# Patient Record
Sex: FEMALE | Race: WHITE | Employment: UNEMPLOYED | URBAN - METROPOLITAN AREA
[De-identification: names, ages, dates, MRNs, and addresses within clinical notes are randomized per-mention and may not be internally consistent; named-entity substitution may affect disease eponyms.]

---

## 2017-11-15 ENCOUNTER — TELEPHONE (OUTPATIENT)
Dept: FAMILY MEDICINE CLINIC | Facility: CLINIC | Age: 32
End: 2017-11-15

## 2019-06-13 ENCOUNTER — LAB ENCOUNTER (OUTPATIENT)
Dept: LAB | Facility: HOSPITAL | Age: 34
End: 2019-06-13
Attending: OBSTETRICS & GYNECOLOGY
Payer: COMMERCIAL

## 2019-06-13 ENCOUNTER — HOSPITAL ENCOUNTER (OUTPATIENT)
Dept: GENERAL RADIOLOGY | Facility: HOSPITAL | Age: 34
Discharge: HOME | End: 2019-06-13
Attending: OBSTETRICS & GYNECOLOGY
Payer: COMMERCIAL

## 2019-06-13 DIAGNOSIS — Z01.818 PRE-OP TESTING: Primary | ICD-10-CM

## 2019-06-13 DIAGNOSIS — N97.9 INFERTILITY, FEMALE: ICD-10-CM

## 2019-06-13 PROCEDURE — 58340 CATHETER FOR HYSTEROGRAPHY: CPT | Performed by: OBSTETRICS & GYNECOLOGY

## 2019-06-13 PROCEDURE — 74740 X-RAY FEMALE GENITAL TRACT: CPT | Performed by: OBSTETRICS & GYNECOLOGY

## 2020-02-18 ENCOUNTER — WALK IN (OUTPATIENT)
Dept: URGENT CARE | Age: 35
End: 2020-02-18

## 2020-02-18 DIAGNOSIS — Z23 NEED FOR VARICELLA VACCINE: Primary | ICD-10-CM

## 2020-02-18 PROCEDURE — 90471 IMMUNIZATION ADMIN: CPT | Performed by: NURSE PRACTITIONER

## 2020-02-18 PROCEDURE — 90716 VAR VACCINE LIVE SUBQ: CPT | Performed by: NURSE PRACTITIONER

## 2020-03-17 ENCOUNTER — TELEPHONE (OUTPATIENT)
Dept: SCHEDULING | Age: 35
End: 2020-03-17

## 2020-03-17 ENCOUNTER — WALK IN (OUTPATIENT)
Dept: URGENT CARE | Age: 35
End: 2020-03-17

## 2020-03-17 DIAGNOSIS — Z23 NEED FOR VARICELLA VACCINE: Primary | ICD-10-CM

## 2020-03-17 PROCEDURE — 90471 IMMUNIZATION ADMIN: CPT | Performed by: NURSE PRACTITIONER

## 2020-03-17 PROCEDURE — 90716 VAR VACCINE LIVE SUBQ: CPT | Performed by: NURSE PRACTITIONER

## 2020-05-29 LAB
ANTIBODY SCREEN OB: NEGATIVE
HEPATITIS B SURFACE ANTIGEN OB: NEGATIVE
HIV RESULT OB: NEGATIVE
RH FACTOR OB: POSITIVE

## 2020-12-01 ENCOUNTER — APPOINTMENT (OUTPATIENT)
Dept: HEMATOLOGY/ONCOLOGY | Facility: HOSPITAL | Age: 35
End: 2020-12-01
Attending: INTERNAL MEDICINE
Payer: COMMERCIAL

## 2020-12-09 ENCOUNTER — OFFICE VISIT (OUTPATIENT)
Dept: HEMATOLOGY/ONCOLOGY | Facility: HOSPITAL | Age: 35
End: 2020-12-09
Attending: INTERNAL MEDICINE
Payer: COMMERCIAL

## 2020-12-09 VITALS
HEART RATE: 109 BPM | RESPIRATION RATE: 16 BRPM | WEIGHT: 217 LBS | SYSTOLIC BLOOD PRESSURE: 129 MMHG | TEMPERATURE: 97 F | OXYGEN SATURATION: 96 % | DIASTOLIC BLOOD PRESSURE: 78 MMHG

## 2020-12-09 DIAGNOSIS — D50.9 IRON DEFICIENCY ANEMIA DURING PREGNANCY: Primary | ICD-10-CM

## 2020-12-09 DIAGNOSIS — O99.019 IRON DEFICIENCY ANEMIA DURING PREGNANCY: Primary | ICD-10-CM

## 2020-12-09 DIAGNOSIS — Z34.90 PREGNANCY: Primary | ICD-10-CM

## 2020-12-09 PROCEDURE — 99243 OFF/OP CNSLTJ NEW/EST LOW 30: CPT | Performed by: INTERNAL MEDICINE

## 2020-12-09 RX ORDER — PNV NO.95/FERROUS FUM/FOLIC AC 28MG-0.8MG
TABLET ORAL
COMMUNITY
End: 2021-04-29 | Stop reason: ALTCHOICE

## 2020-12-09 NOTE — PROGRESS NOTES
Pt referred by OB for anemia. Pt is 35 weeks pregnant  C Section scheduled for 1/7/2021  3rd pregnancy. Has been anemic with other pregnancies, but improved with oral iron. She it taking oral iron daily  Energy is low.    Education Record    Learner:

## 2020-12-09 NOTE — CONSULTS
[facility] Report of Consultation    Patient Name: Mireya Gant   YOB: 1985   Medical Record Number: [id number]   CSN: [id number]   Consulting Physician: Reagan Carty MD  Referring Physician(s): Wilhemenia Jeans  Date of Consultation: 1 problems, gait problems   Psych: No anxiety/depression. Vital Signs:  LMP 01/02/2020 (Approximate)     ECOG PS: 0    Physical Examination:    General: Patient is alert and oriented x 3, not in acute distress. HEENT: EOMs intact. PERRL.  Oropharynx is cl

## 2020-12-14 ENCOUNTER — TELEPHONE (OUTPATIENT)
Dept: HEMATOLOGY/ONCOLOGY | Facility: HOSPITAL | Age: 35
End: 2020-12-14

## 2020-12-14 NOTE — TELEPHONE ENCOUNTER
Vicki Levo called asking if she needs to have any dietary restrictions before her infusion on Friday, or if there were any special directions.  Please call

## 2020-12-16 ENCOUNTER — OFFICE VISIT (OUTPATIENT)
Dept: HEMATOLOGY/ONCOLOGY | Facility: HOSPITAL | Age: 35
End: 2020-12-16
Attending: INTERNAL MEDICINE
Payer: COMMERCIAL

## 2020-12-16 VITALS
SYSTOLIC BLOOD PRESSURE: 115 MMHG | RESPIRATION RATE: 17 BRPM | DIASTOLIC BLOOD PRESSURE: 58 MMHG | TEMPERATURE: 99 F | HEART RATE: 89 BPM | OXYGEN SATURATION: 99 %

## 2020-12-16 DIAGNOSIS — D50.9 IRON DEFICIENCY ANEMIA DURING PREGNANCY: Primary | ICD-10-CM

## 2020-12-16 DIAGNOSIS — O99.019 IRON DEFICIENCY ANEMIA DURING PREGNANCY: Primary | ICD-10-CM

## 2020-12-16 PROCEDURE — 96374 THER/PROPH/DIAG INJ IV PUSH: CPT

## 2020-12-18 ENCOUNTER — OFFICE VISIT (OUTPATIENT)
Dept: HEMATOLOGY/ONCOLOGY | Facility: HOSPITAL | Age: 35
End: 2020-12-18
Attending: INTERNAL MEDICINE
Payer: COMMERCIAL

## 2020-12-18 VITALS
DIASTOLIC BLOOD PRESSURE: 62 MMHG | HEART RATE: 91 BPM | SYSTOLIC BLOOD PRESSURE: 110 MMHG | TEMPERATURE: 99 F | RESPIRATION RATE: 18 BRPM

## 2020-12-18 DIAGNOSIS — D50.9 IRON DEFICIENCY ANEMIA DURING PREGNANCY: Primary | ICD-10-CM

## 2020-12-18 DIAGNOSIS — O99.019 IRON DEFICIENCY ANEMIA DURING PREGNANCY: Primary | ICD-10-CM

## 2020-12-18 PROCEDURE — 96374 THER/PROPH/DIAG INJ IV PUSH: CPT

## 2020-12-18 NOTE — PROGRESS NOTES
Education Record    Learner:  Patient    Disease / Diagnosis: RIGOBERTO    Barriers / Limitations:  None   Comments:    Method:  Brief focused   Comments:    General Topics:  Plan of care reviewed   Comments:    Outcome:  Shows understanding   Comments:    Megan

## 2020-12-21 ENCOUNTER — OFFICE VISIT (OUTPATIENT)
Dept: HEMATOLOGY/ONCOLOGY | Facility: HOSPITAL | Age: 35
End: 2020-12-21
Attending: INTERNAL MEDICINE
Payer: COMMERCIAL

## 2020-12-21 VITALS
HEART RATE: 89 BPM | TEMPERATURE: 99 F | RESPIRATION RATE: 16 BRPM | SYSTOLIC BLOOD PRESSURE: 107 MMHG | OXYGEN SATURATION: 99 % | DIASTOLIC BLOOD PRESSURE: 65 MMHG

## 2020-12-21 DIAGNOSIS — O99.019 IRON DEFICIENCY ANEMIA DURING PREGNANCY: Primary | ICD-10-CM

## 2020-12-21 DIAGNOSIS — D50.9 IRON DEFICIENCY ANEMIA DURING PREGNANCY: Primary | ICD-10-CM

## 2020-12-21 PROCEDURE — 96374 THER/PROPH/DIAG INJ IV PUSH: CPT

## 2020-12-21 NOTE — PROGRESS NOTES
Pt tolerated venofer infusion well without issue or complaint and completed 30 minute post observation period.      Education Record    Learner:  Patient    Disease / Diagnosis: RIGOBERTO    Barriers / Limitations:  None   Comments:    Method:  Discussion   Comme

## 2020-12-23 ENCOUNTER — OFFICE VISIT (OUTPATIENT)
Dept: HEMATOLOGY/ONCOLOGY | Facility: HOSPITAL | Age: 35
End: 2020-12-23
Attending: INTERNAL MEDICINE
Payer: COMMERCIAL

## 2020-12-23 VITALS
HEART RATE: 88 BPM | RESPIRATION RATE: 16 BRPM | SYSTOLIC BLOOD PRESSURE: 109 MMHG | TEMPERATURE: 97 F | DIASTOLIC BLOOD PRESSURE: 71 MMHG | OXYGEN SATURATION: 95 %

## 2020-12-23 DIAGNOSIS — O99.019 IRON DEFICIENCY ANEMIA DURING PREGNANCY: Primary | ICD-10-CM

## 2020-12-23 DIAGNOSIS — D50.9 IRON DEFICIENCY ANEMIA DURING PREGNANCY: Primary | ICD-10-CM

## 2020-12-23 PROCEDURE — 96374 THER/PROPH/DIAG INJ IV PUSH: CPT

## 2020-12-23 NOTE — PROGRESS NOTES
Education Record    Learner:  Patient    Disease / Diagnosis: RIGOBERTO during pregnancy - IV Venofer infusion    Barriers / Limitations:  None   Comments:    Method:  Brief focused and Reinforcement   Comments:    General Topics:  Plan of care reviewed   Commderek

## 2021-01-04 ENCOUNTER — TELEPHONE (OUTPATIENT)
Dept: HEMATOLOGY/ONCOLOGY | Facility: HOSPITAL | Age: 36
End: 2021-01-04

## 2021-01-04 ENCOUNTER — LAB ENCOUNTER (OUTPATIENT)
Dept: LAB | Facility: HOSPITAL | Age: 36
End: 2021-01-04
Attending: OBSTETRICS & GYNECOLOGY
Payer: COMMERCIAL

## 2021-01-04 ENCOUNTER — TELEPHONE (OUTPATIENT)
Dept: OBGYN UNIT | Facility: HOSPITAL | Age: 36
End: 2021-01-04

## 2021-01-04 ENCOUNTER — NURSE ONLY (OUTPATIENT)
Dept: HEMATOLOGY/ONCOLOGY | Facility: HOSPITAL | Age: 36
End: 2021-01-04
Attending: INTERNAL MEDICINE
Payer: COMMERCIAL

## 2021-01-04 DIAGNOSIS — Z34.90 PREGNANCY: ICD-10-CM

## 2021-01-04 DIAGNOSIS — D50.9 IRON DEFICIENCY ANEMIA DURING PREGNANCY: ICD-10-CM

## 2021-01-04 DIAGNOSIS — O99.019 IRON DEFICIENCY ANEMIA DURING PREGNANCY: ICD-10-CM

## 2021-01-04 LAB
BASOPHILS # BLD AUTO: 0.01 X10(3) UL (ref 0–0.2)
BASOPHILS NFR BLD AUTO: 0.2 %
DEPRECATED HBV CORE AB SER IA-ACNC: 94.5 NG/ML
DEPRECATED RDW RBC AUTO: 61.9 FL (ref 35.1–46.3)
EOSINOPHIL # BLD AUTO: 0.05 X10(3) UL (ref 0–0.7)
EOSINOPHIL NFR BLD AUTO: 1 %
ERYTHROCYTE [DISTWIDTH] IN BLOOD BY AUTOMATED COUNT: 19.4 % (ref 11–15)
HCT VFR BLD AUTO: 37.4 %
HGB BLD-MCNC: 11.9 G/DL
IMM GRANULOCYTES # BLD AUTO: 0.04 X10(3) UL (ref 0–1)
IMM GRANULOCYTES NFR BLD: 0.8 %
IRON SATURATION: 13 %
IRON SERPL-MCNC: 90 UG/DL
LYMPHOCYTES # BLD AUTO: 1.05 X10(3) UL (ref 1–4)
LYMPHOCYTES NFR BLD AUTO: 21 %
MCH RBC QN AUTO: 28.2 PG (ref 26–34)
MCHC RBC AUTO-ENTMCNC: 31.8 G/DL (ref 31–37)
MCV RBC AUTO: 88.6 FL
MONOCYTES # BLD AUTO: 0.31 X10(3) UL (ref 0.1–1)
MONOCYTES NFR BLD AUTO: 6.2 %
NEUTROPHILS # BLD AUTO: 3.53 X10 (3) UL (ref 1.5–7.7)
NEUTROPHILS # BLD AUTO: 3.53 X10(3) UL (ref 1.5–7.7)
NEUTROPHILS NFR BLD AUTO: 70.8 %
PLATELET # BLD AUTO: 227 10(3)UL (ref 150–450)
RBC # BLD AUTO: 4.22 X10(6)UL
TOTAL IRON BINDING CAPACITY: 676 UG/DL (ref 240–450)
TRANSFERRIN SERPL-MCNC: 454 MG/DL (ref 200–360)
WBC # BLD AUTO: 5 X10(3) UL (ref 4–11)

## 2021-01-04 PROCEDURE — 82728 ASSAY OF FERRITIN: CPT

## 2021-01-04 PROCEDURE — 36415 COLL VENOUS BLD VENIPUNCTURE: CPT

## 2021-01-04 PROCEDURE — 83540 ASSAY OF IRON: CPT

## 2021-01-04 PROCEDURE — 85025 COMPLETE CBC W/AUTO DIFF WBC: CPT

## 2021-01-04 PROCEDURE — 83550 IRON BINDING TEST: CPT

## 2021-01-04 RX ORDER — ASPIRIN 81 MG/1
TABLET, CHEWABLE ORAL DAILY
Status: ON HOLD | COMMUNITY
End: 2021-01-10

## 2021-01-04 RX ORDER — CHOLECALCIFEROL (VITAMIN D3) 25 MCG
1 TABLET,CHEWABLE ORAL DAILY
COMMUNITY

## 2021-01-04 NOTE — TELEPHONE ENCOUNTER
LM on pt's VM- per Dr. Nancie Palafox, Hb and fe much better, no need I.V iron before c section but should get labs done 2 weeks after c section to see if she would benefit from additional I.V iron     Asked pt to call back to confirm she received message.

## 2021-01-04 NOTE — TELEPHONE ENCOUNTER
Attempted to call pt back- NA. LM to call triage RN    Patient called looking to have her iron test results explained to her.

## 2021-01-05 LAB — SARS-COV-2 RNA RESP QL NAA+PROBE: NOT DETECTED

## 2021-01-07 ENCOUNTER — HOSPITAL ENCOUNTER (INPATIENT)
Facility: HOSPITAL | Age: 36
LOS: 3 days | Discharge: HOME | End: 2021-01-10
Attending: OBSTETRICS & GYNECOLOGY | Admitting: OBSTETRICS & GYNECOLOGY
Payer: COMMERCIAL

## 2021-01-07 ENCOUNTER — ANESTHESIA (OUTPATIENT)
Dept: OBGYN UNIT | Facility: HOSPITAL | Age: 36
End: 2021-01-07
Payer: COMMERCIAL

## 2021-01-07 ENCOUNTER — ANESTHESIA EVENT (OUTPATIENT)
Dept: OBGYN UNIT | Facility: HOSPITAL | Age: 36
End: 2021-01-07
Payer: COMMERCIAL

## 2021-01-07 PROBLEM — Z34.90 PREGNANCY: Status: ACTIVE | Noted: 2021-01-07

## 2021-01-07 PROBLEM — Z34.90 PREGNANCY (HCC): Status: ACTIVE | Noted: 2021-01-07

## 2021-01-07 LAB
ANTIBODY SCREEN: NEGATIVE
BASOPHILS # BLD AUTO: 0.02 X10(3) UL (ref 0–0.2)
BASOPHILS NFR BLD AUTO: 0.4 %
DEPRECATED RDW RBC AUTO: 59.7 FL (ref 35.1–46.3)
EOSINOPHIL # BLD AUTO: 0.11 X10(3) UL (ref 0–0.7)
EOSINOPHIL NFR BLD AUTO: 2 %
ERYTHROCYTE [DISTWIDTH] IN BLOOD BY AUTOMATED COUNT: 19 % (ref 11–15)
HCT VFR BLD AUTO: 35.3 %
HGB BLD-MCNC: 11.7 G/DL
HIV 1+2 AB+HIV1 P24 AG SERPL QL IA: NONREACTIVE
IMM GRANULOCYTES # BLD AUTO: 0.03 X10(3) UL (ref 0–1)
IMM GRANULOCYTES NFR BLD: 0.5 %
LYMPHOCYTES # BLD AUTO: 1.55 X10(3) UL (ref 1–4)
LYMPHOCYTES NFR BLD AUTO: 27.7 %
MCH RBC QN AUTO: 28.7 PG (ref 26–34)
MCHC RBC AUTO-ENTMCNC: 33.1 G/DL (ref 31–37)
MCV RBC AUTO: 86.5 FL
MONOCYTES # BLD AUTO: 0.42 X10(3) UL (ref 0.1–1)
MONOCYTES NFR BLD AUTO: 7.5 %
NEUTROPHILS # BLD AUTO: 3.47 X10 (3) UL (ref 1.5–7.7)
NEUTROPHILS # BLD AUTO: 3.47 X10(3) UL (ref 1.5–7.7)
NEUTROPHILS NFR BLD AUTO: 61.9 %
PLATELET # BLD AUTO: 205 10(3)UL (ref 150–450)
RBC # BLD AUTO: 4.08 X10(6)UL
RH BLOOD TYPE: POSITIVE
T PALLIDUM AB SER QL IA: NONREACTIVE
WBC # BLD AUTO: 5.6 X10(3) UL (ref 4–11)

## 2021-01-07 PROCEDURE — 85025 COMPLETE CBC W/AUTO DIFF WBC: CPT | Performed by: OBSTETRICS & GYNECOLOGY

## 2021-01-07 PROCEDURE — 86780 TREPONEMA PALLIDUM: CPT | Performed by: OBSTETRICS & GYNECOLOGY

## 2021-01-07 PROCEDURE — 86901 BLOOD TYPING SEROLOGIC RH(D): CPT | Performed by: OBSTETRICS & GYNECOLOGY

## 2021-01-07 PROCEDURE — 86701 HIV-1ANTIBODY: CPT | Performed by: OBSTETRICS & GYNECOLOGY

## 2021-01-07 PROCEDURE — 86900 BLOOD TYPING SEROLOGIC ABO: CPT | Performed by: OBSTETRICS & GYNECOLOGY

## 2021-01-07 PROCEDURE — 86850 RBC ANTIBODY SCREEN: CPT | Performed by: OBSTETRICS & GYNECOLOGY

## 2021-01-07 RX ORDER — ONDANSETRON 2 MG/ML
4 INJECTION INTRAMUSCULAR; INTRAVENOUS ONCE AS NEEDED
Status: DISCONTINUED | OUTPATIENT
Start: 2021-01-07 | End: 2021-01-07 | Stop reason: HOSPADM

## 2021-01-07 RX ORDER — TRISODIUM CITRATE DIHYDRATE AND CITRIC ACID MONOHYDRATE 500; 334 MG/5ML; MG/5ML
30 SOLUTION ORAL ONCE
Status: DISCONTINUED | OUTPATIENT
Start: 2021-01-07 | End: 2021-01-07

## 2021-01-07 RX ORDER — DIPHENHYDRAMINE HYDROCHLORIDE 50 MG/ML
25 INJECTION INTRAMUSCULAR; INTRAVENOUS ONCE AS NEEDED
Status: DISCONTINUED | OUTPATIENT
Start: 2021-01-07 | End: 2021-01-07 | Stop reason: HOSPADM

## 2021-01-07 RX ORDER — ONDANSETRON 2 MG/ML
4 INJECTION INTRAMUSCULAR; INTRAVENOUS EVERY 6 HOURS PRN
Status: DISCONTINUED | OUTPATIENT
Start: 2021-01-07 | End: 2021-01-07

## 2021-01-07 RX ORDER — DIPHENHYDRAMINE HYDROCHLORIDE 50 MG/ML
12.5 INJECTION INTRAMUSCULAR; INTRAVENOUS EVERY 4 HOURS PRN
Status: DISCONTINUED | OUTPATIENT
Start: 2021-01-07 | End: 2021-01-10

## 2021-01-07 RX ORDER — SODIUM CHLORIDE, SODIUM LACTATE, POTASSIUM CHLORIDE, CALCIUM CHLORIDE 600; 310; 30; 20 MG/100ML; MG/100ML; MG/100ML; MG/100ML
125 INJECTION, SOLUTION INTRAVENOUS CONTINUOUS
Status: DISCONTINUED | OUTPATIENT
Start: 2021-01-07 | End: 2021-01-07

## 2021-01-07 RX ORDER — CEFAZOLIN SODIUM/WATER 2 G/20 ML
SYRINGE (ML) INTRAVENOUS AS NEEDED
Status: DISCONTINUED | OUTPATIENT
Start: 2021-01-07 | End: 2021-01-07 | Stop reason: SURG

## 2021-01-07 RX ORDER — KETOROLAC TROMETHAMINE 30 MG/ML
30 INJECTION, SOLUTION INTRAMUSCULAR; INTRAVENOUS EVERY 6 HOURS
Status: COMPLETED | OUTPATIENT
Start: 2021-01-07 | End: 2021-01-08

## 2021-01-07 RX ORDER — CEFAZOLIN SODIUM/WATER 2 G/20 ML
2 SYRINGE (ML) INTRAVENOUS ONCE
Status: DISCONTINUED | OUTPATIENT
Start: 2021-01-07 | End: 2021-01-07

## 2021-01-07 RX ORDER — NALBUPHINE HCL 10 MG/ML
2.5 AMPUL (ML) INJECTION EVERY 4 HOURS PRN
Status: DISCONTINUED | OUTPATIENT
Start: 2021-01-07 | End: 2021-01-10

## 2021-01-07 RX ORDER — KETOROLAC TROMETHAMINE 30 MG/ML
30 INJECTION, SOLUTION INTRAMUSCULAR; INTRAVENOUS ONCE AS NEEDED
Status: DISCONTINUED | OUTPATIENT
Start: 2021-01-07 | End: 2021-01-07 | Stop reason: HOSPADM

## 2021-01-07 RX ORDER — MORPHINE SULFATE 2 MG/ML
INJECTION, SOLUTION INTRAMUSCULAR; INTRAVENOUS AS NEEDED
Status: DISCONTINUED | OUTPATIENT
Start: 2021-01-07 | End: 2021-01-07 | Stop reason: SURG

## 2021-01-07 RX ORDER — DOCUSATE SODIUM 100 MG/1
100 CAPSULE, LIQUID FILLED ORAL
Status: DISCONTINUED | OUTPATIENT
Start: 2021-01-08 | End: 2021-01-10

## 2021-01-07 RX ORDER — MORPHINE SULFATE 4 MG/ML
2 INJECTION, SOLUTION INTRAMUSCULAR; INTRAVENOUS EVERY 5 MIN PRN
Status: DISCONTINUED | OUTPATIENT
Start: 2021-01-07 | End: 2021-01-07 | Stop reason: HOSPADM

## 2021-01-07 RX ORDER — DIPHENHYDRAMINE HCL 25 MG
25 CAPSULE ORAL EVERY 4 HOURS PRN
Status: DISCONTINUED | OUTPATIENT
Start: 2021-01-07 | End: 2021-01-10

## 2021-01-07 RX ORDER — KETOROLAC TROMETHAMINE 30 MG/ML
INJECTION, SOLUTION INTRAMUSCULAR; INTRAVENOUS
Status: COMPLETED
Start: 2021-01-07 | End: 2021-01-07

## 2021-01-07 RX ORDER — ONDANSETRON 2 MG/ML
4 INJECTION INTRAMUSCULAR; INTRAVENOUS EVERY 6 HOURS PRN
Status: DISCONTINUED | OUTPATIENT
Start: 2021-01-07 | End: 2021-01-10

## 2021-01-07 RX ORDER — SODIUM CHLORIDE, SODIUM LACTATE, POTASSIUM CHLORIDE, CALCIUM CHLORIDE 600; 310; 30; 20 MG/100ML; MG/100ML; MG/100ML; MG/100ML
INJECTION, SOLUTION INTRAVENOUS CONTINUOUS
Status: DISCONTINUED | OUTPATIENT
Start: 2021-01-07 | End: 2021-01-10

## 2021-01-07 RX ORDER — OXYTOCIN 10 [USP'U]/ML
INJECTION, SOLUTION INTRAMUSCULAR; INTRAVENOUS AS NEEDED
Status: DISCONTINUED | OUTPATIENT
Start: 2021-01-07 | End: 2021-01-07 | Stop reason: SURG

## 2021-01-07 RX ORDER — BUPIVACAINE HYDROCHLORIDE 7.5 MG/ML
INJECTION, SOLUTION INTRASPINAL AS NEEDED
Status: DISCONTINUED | OUTPATIENT
Start: 2021-01-07 | End: 2021-01-07 | Stop reason: SURG

## 2021-01-07 RX ORDER — ACETAMINOPHEN 500 MG
1000 TABLET ORAL ONCE
Status: COMPLETED | OUTPATIENT
Start: 2021-01-07 | End: 2021-01-07

## 2021-01-07 RX ORDER — BISACODYL 10 MG
10 SUPPOSITORY, RECTAL RECTAL
Status: DISCONTINUED | OUTPATIENT
Start: 2021-01-07 | End: 2021-01-10

## 2021-01-07 RX ORDER — MORPHINE SULFATE 4 MG/ML
2 INJECTION, SOLUTION INTRAMUSCULAR; INTRAVENOUS EVERY 2 HOUR PRN
Status: ACTIVE | OUTPATIENT
Start: 2021-01-07 | End: 2021-01-08

## 2021-01-07 RX ORDER — NALOXONE HYDROCHLORIDE 0.4 MG/ML
0.08 INJECTION, SOLUTION INTRAMUSCULAR; INTRAVENOUS; SUBCUTANEOUS
Status: ACTIVE | OUTPATIENT
Start: 2021-01-07 | End: 2021-01-08

## 2021-01-07 RX ORDER — ACETAMINOPHEN 500 MG
1000 TABLET ORAL EVERY 6 HOURS
Status: DISCONTINUED | OUTPATIENT
Start: 2021-01-07 | End: 2021-01-10

## 2021-01-07 RX ORDER — DEXTROSE, SODIUM CHLORIDE, SODIUM LACTATE, POTASSIUM CHLORIDE, AND CALCIUM CHLORIDE 5; .6; .31; .03; .02 G/100ML; G/100ML; G/100ML; G/100ML; G/100ML
INJECTION, SOLUTION INTRAVENOUS CONTINUOUS PRN
Status: DISCONTINUED | OUTPATIENT
Start: 2021-01-07 | End: 2021-01-10

## 2021-01-07 RX ORDER — IBUPROFEN 600 MG/1
600 TABLET ORAL EVERY 6 HOURS
Status: DISCONTINUED | OUTPATIENT
Start: 2021-01-08 | End: 2021-01-10

## 2021-01-07 RX ORDER — MORPHINE SULFATE 0.5 MG/ML
0.2 INJECTION, SOLUTION EPIDURAL; INTRATHECAL; INTRAVENOUS ONCE
Status: DISCONTINUED | OUTPATIENT
Start: 2021-01-07 | End: 2021-01-10

## 2021-01-07 RX ORDER — NALBUPHINE HCL 10 MG/ML
2.5 AMPUL (ML) INJECTION
Status: DISCONTINUED | OUTPATIENT
Start: 2021-01-07 | End: 2021-01-10

## 2021-01-07 RX ORDER — GABAPENTIN 300 MG/1
300 CAPSULE ORAL EVERY 8 HOURS PRN
Status: DISCONTINUED | OUTPATIENT
Start: 2021-01-07 | End: 2021-01-10

## 2021-01-07 RX ORDER — SIMETHICONE 80 MG
80 TABLET,CHEWABLE ORAL 3 TIMES DAILY PRN
Status: DISCONTINUED | OUTPATIENT
Start: 2021-01-07 | End: 2021-01-10

## 2021-01-07 RX ADMIN — MORPHINE SULFATE 0.2 MG: 2 INJECTION, SOLUTION INTRAMUSCULAR; INTRAVENOUS at 07:43:00

## 2021-01-07 RX ADMIN — CEFAZOLIN SODIUM/WATER 2 G: 2 G/20 ML SYRINGE (ML) INTRAVENOUS at 07:45:00

## 2021-01-07 RX ADMIN — OXYTOCIN 10 UNITS: 10 INJECTION, SOLUTION INTRAMUSCULAR; INTRAVENOUS at 08:09:00

## 2021-01-07 RX ADMIN — MORPHINE SULFATE 1.8 MG: 2 INJECTION, SOLUTION INTRAMUSCULAR; INTRAVENOUS at 08:09:00

## 2021-01-07 RX ADMIN — BUPIVACAINE HYDROCHLORIDE 1.8 ML: 7.5 INJECTION, SOLUTION INTRASPINAL at 07:43:00

## 2021-01-07 NOTE — ANESTHESIA POSTPROCEDURE EVALUATION
[facility] Patient Status:  Inpatient   Age/Gender 30year old female MRN [id number]   Location [address] [facility] Attending Rachael Capellan MD, MD   [address] Day # 0 PCP Spencer Shukla DO       Anesthesia Post-op Note    P

## 2021-01-07 NOTE — L&D DELIVERY NOTE
Mandi Menendez, Girl [[id number]]    Labor Events     labor?: No   steroids?: None  Antibiotics received during labor?: No  Antibiotics (enter # doses in comment): other (Comment: 2g ancef IVP)  Rupture date/time: 2021 0806     Rupture type: AR tone Limp Some flexion Active motion    Respiratory effort Absent Weak cry; hypoventilation Good, crying              1 Minute:  5 Minute:  10 Minute:  15 Minute:  20 Minute:    Skin color: 0  1       Heart rate: 2  2       Reflex irritablity: 2  2       M

## 2021-01-07 NOTE — H&P
[facility]    PATIENT'S NAME: Leila Bookbinder   ATTENDING PHYSICIAN: Moraima Daniels M.D.    PATIENT ACCOUNT#:   [de-identified]    [facility]  MEDICAL RECORD #:   [id number]       YOB: 1985  ADMISSION DATE:       01/07/2021 AFP was also done and it was negative for neural tube defect. Her early Glucola was normal and her third trimester Glucola was negative. Her hemoglobin A1c was 4.8. Her vitamin D was initially low but then 49.3 in the last part of the pregnancy.   Her pa pregnancy at 39 weeks. 2.   History of LGA babies. 3.   History of  section x2, desires repeat. 4.   Anemia on iron. 5. Low vitamin D, on replacement. 6.   The patient also had an abnormal panorama. No results of monosomy X.   All other co

## 2021-01-07 NOTE — ANESTHESIA PREPROCEDURE EVALUATION
PRE-OP EVALUATION    Patient Name: Karo Melvin    Pre-op Diagnosis: Term Labor    Procedure(s):      Surgeon(s) and Role:     * Dakota Tillman MD, MD - Primary     * Christophe Apgar, MD - Assisting Surgeon    Pre-op vitals reviewed. Temp: 97. Neuro/Psych    Negative neuro/psych ROS.                           Patient Active Problem List:     Iron deficiency anemia during pregnancy     Pregnancy         Past Surgical History:   Procedure Laterality Date   •      • OTHER      c sections to

## 2021-01-07 NOTE — PROGRESS NOTES
admitted to triage 5 with  present. Pt ambulated to room, pt here for scheduled repeat  section. Pt to bathroom, gown on, pt to bed. efm and toco applied. poc discussed. Consents signed.

## 2021-01-07 NOTE — ANESTHESIA PROCEDURE NOTES
Spinal Block  Performed by: Shay Zheng MD  Authorized by: Shay Zheng MD       General Information and Staff    Start Time:   Anesthesiologist: Shay Zheng MD  Performed by:   Anesthesiologist  Site identification: surface landmarks    Preanest

## 2021-01-07 NOTE — OPERATIVE REPORT
[facility]    PATIENT'S NAME: Debbie Warren   ATTENDING PHYSICIAN: Lord Leonardo M.D. OPERATING PHYSICIAN: Lord Leonardo M.D.    PATIENT ACCOUNT#:   [de-identified]    LOCATION:  [address] [place] [address]  MEDICAL RECORD #:   [id number]       DATE OF BIRTH:  5 scissors, and then the fascia was taken off of the muscle inferiorly and superiorly. The muscle in the midline was opened sharply and then the peritoneum was entered bluntly. The muscle was  bluntly.   Of note is that the bladder was attached to and then a  pressure dressing was applied. ESTIMATED BLOOD LOSS:  800 mL. IV FLUIDS:  1200 mL. URINE OUTPUT:  400 mL. SPONGE NEEDLE AND INSTRUMENT COUNTS:  Correct. The patient was taken to recovery room in stable condition.     Dictated B

## 2021-01-07 NOTE — PROGRESS NOTES
Admitted from L/D per W/C or bed. VSS. Bands checked X2. Oriented to room. Plan of care discussed. Answered all questions and pt understands. Call light in reach.

## 2021-01-07 NOTE — PROGRESS NOTES
Patient seen and risk and benefits of the surgery reviewed in detail. She verbalized good understanding of the procedure and agrees to proceed.

## 2021-01-07 NOTE — PLAN OF CARE
Problem: Patient/Family Goals  Goal: Patient/Family Long Term Goal  Description: Patient's Long Term Goal: Uncomplicated Delivery Process    Interventions:  -   - See additional Care Plan goals for specific interventions  1/7/2021 0654 by SIMA Treviño

## 2021-01-08 LAB
BASOPHILS # BLD AUTO: 0.02 X10(3) UL (ref 0–0.2)
BASOPHILS NFR BLD AUTO: 0.3 %
DEPRECATED RDW RBC AUTO: 63.5 FL (ref 35.1–46.3)
EOSINOPHIL # BLD AUTO: 0.1 X10(3) UL (ref 0–0.7)
EOSINOPHIL NFR BLD AUTO: 1.7 %
ERYTHROCYTE [DISTWIDTH] IN BLOOD BY AUTOMATED COUNT: 19.5 % (ref 11–15)
HCT VFR BLD AUTO: 31.6 %
HGB BLD-MCNC: 10 G/DL
IMM GRANULOCYTES # BLD AUTO: 0.03 X10(3) UL (ref 0–1)
IMM GRANULOCYTES NFR BLD: 0.5 %
LYMPHOCYTES # BLD AUTO: 1.04 X10(3) UL (ref 1–4)
LYMPHOCYTES NFR BLD AUTO: 17.4 %
MCH RBC QN AUTO: 28.5 PG (ref 26–34)
MCHC RBC AUTO-ENTMCNC: 31.6 G/DL (ref 31–37)
MCV RBC AUTO: 90 FL
MONOCYTES # BLD AUTO: 0.55 X10(3) UL (ref 0.1–1)
MONOCYTES NFR BLD AUTO: 9.2 %
NEUTROPHILS # BLD AUTO: 4.24 X10 (3) UL (ref 1.5–7.7)
NEUTROPHILS # BLD AUTO: 4.24 X10(3) UL (ref 1.5–7.7)
NEUTROPHILS NFR BLD AUTO: 70.9 %
PLATELET # BLD AUTO: 183 10(3)UL (ref 150–450)
RBC # BLD AUTO: 3.51 X10(6)UL
WBC # BLD AUTO: 6 X10(3) UL (ref 4–11)

## 2021-01-08 PROCEDURE — 85025 COMPLETE CBC W/AUTO DIFF WBC: CPT | Performed by: OBSTETRICS & GYNECOLOGY

## 2021-01-08 RX ORDER — HYDROCODONE BITARTRATE AND ACETAMINOPHEN 5; 325 MG/1; MG/1
1 TABLET ORAL EVERY 6 HOURS PRN
Status: DISCONTINUED | OUTPATIENT
Start: 2021-01-08 | End: 2021-01-10

## 2021-01-08 NOTE — PROGRESS NOTES
[facility] 2SW-J ramiro Melvin Patient Status:  Inpatient   Age/Gender 30year old female MRN [id number]   [facility] 2SW-J Attending Dakota Tillman MD, MD   [facility] Day # 1 PCP Sherri Cardoso, DO      Anesthesia Pain Progres

## 2021-01-08 NOTE — PLAN OF CARE
Problem: GASTROINTESTINAL - ADULT  Goal: Minimal or absence of nausea and vomiting  Description: INTERVENTIONS:  - Maintain adequate hydration with IV or PO as ordered and tolerated  - Nasogastric tube to low intermittent suction as ordered  - Evaluate e Initiate breast feeding within first hour after birth. - Monitor effectiveness of current breast feeding efforts.   - Assess support systems available to mother/family.  - Identify cultural beliefs/practices regarding lactation, letdown techniques, matern to participate in  daily care. - Assess support systems available to mother/family.  - Provide /case management support as needed.   Outcome: Progressing

## 2021-01-08 NOTE — PLAN OF CARE
Problem: GASTROINTESTINAL - ADULT  Goal: Maintains or returns to baseline bowel function  Description: INTERVENTIONS:  - Assess bowel function  - Maintain adequate hydration with IV or PO as ordered and tolerated  - Evaluate effectiveness of GI medicatio fingers/hand) versus late cue of crying.  - Discuss/demonstrate breast feeding aids (e.g., infant sling, nursing footstool/pillows, and breast pumps). - Encourage mother/other family members to express feelings/concerns, and actively listen.   - Educate fa

## 2021-01-08 NOTE — PROGRESS NOTES
[facility]  Post-Partum Caesarean Section Progress Note    [place] Patient Status:  Inpatient    1985 MRN [id number]   [facility] 2SW-J Attending Jennifer Mccartney MD, MD   [facility] Day [facility] 1 PCP Eliceo Layton DO     SUBJECTIVE:

## 2021-01-09 NOTE — PROGRESS NOTES
[facility]  Post-Partum Caesarean Section Progress Note    Chery Haider Patient Status:  Inpatient    1985 MRN [id number]   [facility] 2SW-J Attending Kamran Gautam MD, MD   [facility] Day # 2 PCP Abraham Larson DO     SUBJECTIVE:

## 2021-01-10 VITALS
DIASTOLIC BLOOD PRESSURE: 76 MMHG | TEMPERATURE: 99 F | OXYGEN SATURATION: 97 % | HEIGHT: 68 IN | SYSTOLIC BLOOD PRESSURE: 118 MMHG | WEIGHT: 220 LBS | BODY MASS INDEX: 33.34 KG/M2 | RESPIRATION RATE: 18 BRPM | HEART RATE: 91 BPM

## 2021-01-10 PROBLEM — Z34.90 PREGNANCY (HCC): Status: RESOLVED | Noted: 2021-01-07 | Resolved: 2021-01-10

## 2021-01-10 PROBLEM — Z34.90 PREGNANCY: Status: RESOLVED | Noted: 2021-01-07 | Resolved: 2021-01-10

## 2021-01-10 RX ORDER — GABAPENTIN 300 MG/1
300 CAPSULE ORAL EVERY 8 HOURS PRN
Qty: 24 CAPSULE | Refills: 0 | Status: SHIPPED | OUTPATIENT
Start: 2021-01-10 | End: 2021-01-14

## 2021-01-10 NOTE — PROGRESS NOTES
Discharge patient home as order. Teaching complete, patient feel comfortable to take care herself and  infant. Hugs and kisses off.  Sent both mom and infant to their family car @

## 2021-01-10 NOTE — PROGRESS NOTES
[facility]  Post-Partum Caesarean Section Progress Note    Karo Melvin Patient Status:  Inpatient    1985 MRN [id number]   [facility] 2SW-J Attending Dakota Tillman MD, MD   [facility] Day # 3 PCP Sherri Cardoso DO     SUBJECTIVE:

## 2021-01-12 ENCOUNTER — TELEPHONE (OUTPATIENT)
Dept: OBGYN UNIT | Facility: HOSPITAL | Age: 36
End: 2021-01-12

## 2021-01-12 NOTE — DISCHARGE SUMMARY
[facility]    PATIENT'S NAME: Mary Quintana   ATTENDING PHYSICIAN: Lorena Masterson M.D.    PATIENT ACCOUNT#:   [de-identified]    LOCATION:  [address] [place] [address]  MEDICAL RECORD #:   [id number]       YOB: 1985  ADMISSION DATE:       01/07/2021 02:34:01  [facility] [id number]  [facility]/

## 2021-01-12 NOTE — PROGRESS NOTES
01/12/21 1230   Cradle Call Follow up   Cradle call follow up date 01/12/21   Follow up needed Completed   Hypertension or Preeclampsia during pregnancy or delivery No   Outgoing Cradle Call completed: Mom reports that she and infant are doing well.

## 2021-04-29 ENCOUNTER — OFFICE VISIT (OUTPATIENT)
Dept: FAMILY MEDICINE CLINIC | Facility: CLINIC | Age: 36
End: 2021-04-29
Payer: COMMERCIAL

## 2021-04-29 VITALS
WEIGHT: 198 LBS | DIASTOLIC BLOOD PRESSURE: 68 MMHG | SYSTOLIC BLOOD PRESSURE: 110 MMHG | HEART RATE: 80 BPM | RESPIRATION RATE: 12 BRPM | HEIGHT: 68 IN | TEMPERATURE: 98 F | BODY MASS INDEX: 30.01 KG/M2

## 2021-04-29 DIAGNOSIS — Z00.00 WELL WOMAN EXAM WITHOUT GYNECOLOGICAL EXAM: Primary | ICD-10-CM

## 2021-04-29 DIAGNOSIS — D50.9 IRON DEFICIENCY ANEMIA, UNSPECIFIED IRON DEFICIENCY ANEMIA TYPE: ICD-10-CM

## 2021-04-29 PROCEDURE — 3008F BODY MASS INDEX DOCD: CPT | Performed by: FAMILY MEDICINE

## 2021-04-29 PROCEDURE — 3078F DIAST BP <80 MM HG: CPT | Performed by: FAMILY MEDICINE

## 2021-04-29 PROCEDURE — 3074F SYST BP LT 130 MM HG: CPT | Performed by: FAMILY MEDICINE

## 2021-04-29 PROCEDURE — 99385 PREV VISIT NEW AGE 18-39: CPT | Performed by: FAMILY MEDICINE

## 2021-04-29 RX ORDER — CHLORAL HYDRATE 500 MG
CAPSULE ORAL DAILY
COMMUNITY
Start: 2021-01-01 | End: 2021-09-10

## 2021-04-29 RX ORDER — CHOLECALCIFEROL (VITAMIN D3) 125 MCG
CAPSULE ORAL DAILY
COMMUNITY
Start: 2021-01-01

## 2021-04-29 NOTE — PROGRESS NOTES
SUBJECTIVE:  Patient presents with:  Establish Care: last pap 4/15/21 with gyne- normal  Physical    HPI:  Notes some achiness/bone pain. Delivered third child 4 months ago. Wondering what the cause could be. No injury. PAin comes and goes.      Iron defi 110/68   BP Location: Left arm   Pulse: 80   Resp: 12   Temp: 98.2 °F (36.8 °C)   TempSrc: Temporal   Weight: 198 lb (89.8 kg)   Height: 5' 8\" (1.727 m)     Physical Examination: General appearance - alert, well appearing, and in no distress and normal ap detail today  Hypertension screening: normotensive  Depression screening: negative  Cervical Cancer screening: Due 2024    Iron deficiency anemia, unspecified iron deficiency anemia type  -     CBC, PLATELET; NO DIFFERENTIAL;  Future  -     FERRITIN; Future

## 2021-10-29 ENCOUNTER — LAB ENCOUNTER (OUTPATIENT)
Dept: LAB | Age: 36
End: 2021-10-29
Attending: FAMILY MEDICINE
Payer: COMMERCIAL

## 2021-10-29 DIAGNOSIS — Z00.00 WELL WOMAN EXAM WITHOUT GYNECOLOGICAL EXAM: ICD-10-CM

## 2021-10-29 DIAGNOSIS — D50.9 IRON DEFICIENCY ANEMIA, UNSPECIFIED IRON DEFICIENCY ANEMIA TYPE: ICD-10-CM

## 2021-10-29 PROCEDURE — 82728 ASSAY OF FERRITIN: CPT | Performed by: FAMILY MEDICINE

## 2021-10-29 PROCEDURE — 83550 IRON BINDING TEST: CPT | Performed by: FAMILY MEDICINE

## 2021-10-29 PROCEDURE — 80050 GENERAL HEALTH PANEL: CPT | Performed by: FAMILY MEDICINE

## 2021-10-29 PROCEDURE — 80061 LIPID PANEL: CPT | Performed by: FAMILY MEDICINE

## 2021-10-29 PROCEDURE — 82306 VITAMIN D 25 HYDROXY: CPT | Performed by: FAMILY MEDICINE

## 2021-10-29 PROCEDURE — 83540 ASSAY OF IRON: CPT | Performed by: FAMILY MEDICINE

## 2021-10-30 RX ORDER — NORETHINDRONE
0.35 KIT DAILY
COMMUNITY
Start: 2021-09-14

## 2021-11-01 ENCOUNTER — TELEPHONE (OUTPATIENT)
Dept: FAMILY MEDICINE CLINIC | Facility: CLINIC | Age: 36
End: 2021-11-01

## 2021-11-01 NOTE — TELEPHONE ENCOUNTER
Pt  Is calling she received her lab results in My chart and she would like to go over them with a nurse she has some questions.

## 2021-11-02 NOTE — TELEPHONE ENCOUNTER
Called and talked to patient went over labs with her in detail she was concerned about her glucose level but it was normal , I told her to ask for an A1c at her next appointment

## 2022-01-25 ENCOUNTER — OFFICE VISIT (OUTPATIENT)
Dept: FAMILY MEDICINE CLINIC | Facility: CLINIC | Age: 37
End: 2022-01-25
Payer: COMMERCIAL

## 2022-01-25 VITALS
DIASTOLIC BLOOD PRESSURE: 72 MMHG | OXYGEN SATURATION: 98 % | WEIGHT: 191 LBS | HEART RATE: 67 BPM | TEMPERATURE: 98 F | BODY MASS INDEX: 28.95 KG/M2 | SYSTOLIC BLOOD PRESSURE: 112 MMHG | HEIGHT: 68 IN

## 2022-01-25 DIAGNOSIS — R10.84 GENERALIZED ABDOMINAL PAIN: ICD-10-CM

## 2022-01-25 DIAGNOSIS — Z13.1 SCREENING FOR DIABETES MELLITUS: ICD-10-CM

## 2022-01-25 DIAGNOSIS — K92.1 BLOODY STOOL: ICD-10-CM

## 2022-01-25 DIAGNOSIS — Z13.29 SCREENING FOR THYROID DISORDER: ICD-10-CM

## 2022-01-25 DIAGNOSIS — R19.5 MUCUS IN STOOL: Primary | ICD-10-CM

## 2022-01-25 PROCEDURE — 3008F BODY MASS INDEX DOCD: CPT | Performed by: FAMILY MEDICINE

## 2022-01-25 PROCEDURE — 3074F SYST BP LT 130 MM HG: CPT | Performed by: FAMILY MEDICINE

## 2022-01-25 PROCEDURE — 99214 OFFICE O/P EST MOD 30 MIN: CPT | Performed by: FAMILY MEDICINE

## 2022-01-25 PROCEDURE — 3078F DIAST BP <80 MM HG: CPT | Performed by: FAMILY MEDICINE

## 2022-01-25 NOTE — PROGRESS NOTES
Chief Complaint:  Patient presents with:  Bloating: Pain, possible intolerance    HPI:  This is a 29year old female patient presenting for Bloating (Pain, possible intolerance)    Notes dull abdominal pain. Started a year ago after having her baby.  Had maggie Allergies    EXAM:   01/25/22  1450   BP: 112/72   Pulse: 67   Temp: 97.7 °F (36.5 °C)   TempSrc: Temporal   SpO2: 98%   Weight: 191 lb (86.6 kg)   Height: 5' 8\" (1.727 m)     GENERAL: vitals reviewed and listed above, alert, oriented, appears well hydrat inflammatory markers and rule out anemia. Recommended she follow-up with gastroenterology for colonoscopy. -     CELIAC DISEASE SCREEN REFLEX; Future  -     SED RATE, WESTERGREN (AUTOMATED);  Future  -     C-REACTIVE PROTEIN; Future  -     CBC WITH DIFFER

## 2022-08-09 ENCOUNTER — TELEPHONE (OUTPATIENT)
Dept: FAMILY MEDICINE CLINIC | Facility: CLINIC | Age: 37
End: 2022-08-09

## 2022-08-09 DIAGNOSIS — Z01.818 PRE-OP TESTING: Primary | ICD-10-CM

## 2022-08-09 NOTE — TELEPHONE ENCOUNTER
Patient is having a tummy tuck 01/07/23 by Vanda Pablo M.D. The following is required    H&P  CBC  EKG    Patient scheduled with Dr. Maureen Mead 12/08/22, fax in triage.     All results and clearance to be faxed to [facility] at [phone]

## 2022-08-09 NOTE — TELEPHONE ENCOUNTER
Called and talked to patient told her of pended labs and need to get this done before her appointment EKG can be done a visit.

## 2022-12-09 ENCOUNTER — OFFICE VISIT (OUTPATIENT)
Dept: FAMILY MEDICINE CLINIC | Facility: CLINIC | Age: 37
End: 2022-12-09
Payer: COMMERCIAL

## 2022-12-09 VITALS
OXYGEN SATURATION: 98 % | HEART RATE: 80 BPM | DIASTOLIC BLOOD PRESSURE: 70 MMHG | HEIGHT: 68 IN | BODY MASS INDEX: 29.4 KG/M2 | RESPIRATION RATE: 16 BRPM | SYSTOLIC BLOOD PRESSURE: 110 MMHG | WEIGHT: 194 LBS

## 2022-12-09 DIAGNOSIS — Z01.818 PRE-OP EXAM: Primary | ICD-10-CM

## 2022-12-09 LAB
ATRIAL RATE: 64 BPM
P AXIS: 28 DEGREES
P-R INTERVAL: 196 MS
Q-T INTERVAL: 398 MS
QRS DURATION: 84 MS
QTC CALCULATION (BEZET): 410 MS
R AXIS: 31 DEGREES
T AXIS: 6 DEGREES
VENTRICULAR RATE: 64 BPM

## 2022-12-09 PROCEDURE — 99244 OFF/OP CNSLTJ NEW/EST MOD 40: CPT | Performed by: FAMILY MEDICINE

## 2022-12-09 PROCEDURE — 93000 ELECTROCARDIOGRAM COMPLETE: CPT | Performed by: FAMILY MEDICINE

## 2022-12-09 PROCEDURE — 3078F DIAST BP <80 MM HG: CPT | Performed by: FAMILY MEDICINE

## 2022-12-09 PROCEDURE — 3008F BODY MASS INDEX DOCD: CPT | Performed by: FAMILY MEDICINE

## 2022-12-09 PROCEDURE — 3074F SYST BP LT 130 MM HG: CPT | Performed by: FAMILY MEDICINE

## 2022-12-12 ENCOUNTER — LAB ENCOUNTER (OUTPATIENT)
Dept: LAB | Age: 37
End: 2022-12-12
Attending: FAMILY MEDICINE
Payer: COMMERCIAL

## 2022-12-12 DIAGNOSIS — K92.1 BLOODY STOOL: ICD-10-CM

## 2022-12-12 DIAGNOSIS — Z13.1 SCREENING FOR DIABETES MELLITUS: ICD-10-CM

## 2022-12-12 DIAGNOSIS — R10.84 GENERALIZED ABDOMINAL PAIN: ICD-10-CM

## 2022-12-12 DIAGNOSIS — R19.5 MUCUS IN STOOL: ICD-10-CM

## 2022-12-12 DIAGNOSIS — Z13.29 SCREENING FOR THYROID DISORDER: ICD-10-CM

## 2022-12-12 DIAGNOSIS — Z01.818 PRE-OP TESTING: ICD-10-CM

## 2022-12-12 LAB
BASOPHILS # BLD AUTO: 0.01 X10(3) UL (ref 0–0.2)
BASOPHILS NFR BLD AUTO: 0.2 %
CRP SERPL-MCNC: <0.29 MG/DL (ref ?–0.3)
DEPRECATED HBV CORE AB SER IA-ACNC: 47.5 NG/ML
DEPRECATED RDW RBC AUTO: 46.6 FL (ref 35.1–46.3)
EOSINOPHIL # BLD AUTO: 0.19 X10(3) UL (ref 0–0.7)
EOSINOPHIL NFR BLD AUTO: 3.7 %
ERYTHROCYTE [DISTWIDTH] IN BLOOD BY AUTOMATED COUNT: 14.1 % (ref 11–15)
ERYTHROCYTE [SEDIMENTATION RATE] IN BLOOD: 47 MM/HR
EST. AVERAGE GLUCOSE BLD GHB EST-MCNC: 97 MG/DL (ref 68–126)
HBA1C MFR BLD: 5 % (ref ?–5.7)
HCT VFR BLD AUTO: 42.6 %
HGB BLD-MCNC: 13.8 G/DL
IGA SERPL-MCNC: 114 MG/DL (ref 70–312)
IMM GRANULOCYTES # BLD AUTO: 0.01 X10(3) UL (ref 0–1)
IMM GRANULOCYTES NFR BLD: 0.2 %
LYMPHOCYTES # BLD AUTO: 2.15 X10(3) UL (ref 1–4)
LYMPHOCYTES NFR BLD AUTO: 42 %
MCH RBC QN AUTO: 29.1 PG (ref 26–34)
MCHC RBC AUTO-ENTMCNC: 32.4 G/DL (ref 31–37)
MCV RBC AUTO: 89.9 FL
MONOCYTES # BLD AUTO: 0.42 X10(3) UL (ref 0.1–1)
MONOCYTES NFR BLD AUTO: 8.2 %
NEUTROPHILS # BLD AUTO: 2.34 X10 (3) UL (ref 1.5–7.7)
NEUTROPHILS # BLD AUTO: 2.34 X10(3) UL (ref 1.5–7.7)
NEUTROPHILS NFR BLD AUTO: 45.7 %
PLATELET # BLD AUTO: 334 10(3)UL (ref 150–450)
RBC # BLD AUTO: 4.74 X10(6)UL
TSI SER-ACNC: 1.38 MIU/ML (ref 0.36–3.74)
WBC # BLD AUTO: 5.1 X10(3) UL (ref 4–11)

## 2022-12-12 PROCEDURE — 83036 HEMOGLOBIN GLYCOSYLATED A1C: CPT | Performed by: FAMILY MEDICINE

## 2022-12-12 PROCEDURE — 82784 ASSAY IGA/IGD/IGG/IGM EACH: CPT | Performed by: FAMILY MEDICINE

## 2022-12-12 PROCEDURE — 85025 COMPLETE CBC W/AUTO DIFF WBC: CPT | Performed by: FAMILY MEDICINE

## 2022-12-12 PROCEDURE — 86364 TISS TRNSGLTMNASE EA IG CLAS: CPT | Performed by: FAMILY MEDICINE

## 2022-12-12 PROCEDURE — 85652 RBC SED RATE AUTOMATED: CPT | Performed by: FAMILY MEDICINE

## 2022-12-13 LAB — TTG IGA SER-ACNC: 0.2 U/ML (ref ?–7)

## 2022-12-15 ENCOUNTER — TELEPHONE (OUTPATIENT)
Dept: FAMILY MEDICINE CLINIC | Facility: CLINIC | Age: 37
End: 2022-12-15

## 2022-12-15 NOTE — TELEPHONE ENCOUNTER
Pt is calling about the lab work she had done to get the results.  She also needs the labs and pre op clearance sent to Dr. Natali Rangel   Will call back with fax number

## 2022-12-17 NOTE — TELEPHONE ENCOUNTER
Patient is calling back stating DR. Uriostegui office is not accepting notes faxed to them because notes states blood work is pending. Dr. Karyle Getting has not reviewed blood work to addend notes. Patient is requesting to speak to nurse about this.

## 2022-12-19 ENCOUNTER — TELEPHONE (OUTPATIENT)
Dept: FAMILY MEDICINE CLINIC | Facility: CLINIC | Age: 37
End: 2022-12-19

## 2023-07-04 ENCOUNTER — PATIENT MESSAGE (OUTPATIENT)
Dept: FAMILY MEDICINE CLINIC | Facility: CLINIC | Age: 38
End: 2023-07-04

## 2023-07-05 NOTE — TELEPHONE ENCOUNTER
From: Sandor Patel  To: Sri Martinez DO  Sent: 7/4/2023 9:48 PM CDT  Subject: Heart rate question     Hello,  I've recently gotten back into working out consistently and I've noticed that my resting heart rate has gone down. I started in the 62s and currently in the low 50's and one time it was 48. From what I've read normal range is . Should I be concerned? I don't have any symptoms that are related to having a lower heart rate.      Thanks,  Seema Forrester

## 2023-08-21 ENCOUNTER — OFFICE VISIT (OUTPATIENT)
Dept: FAMILY MEDICINE CLINIC | Facility: CLINIC | Age: 38
End: 2023-08-21
Payer: COMMERCIAL

## 2023-08-21 VITALS
HEIGHT: 68 IN | OXYGEN SATURATION: 99 % | HEART RATE: 56 BPM | DIASTOLIC BLOOD PRESSURE: 70 MMHG | WEIGHT: 189 LBS | BODY MASS INDEX: 28.64 KG/M2 | SYSTOLIC BLOOD PRESSURE: 128 MMHG | TEMPERATURE: 97 F | RESPIRATION RATE: 16 BRPM

## 2023-08-21 DIAGNOSIS — K13.79 RECURRENT MOUTH ULCERATION: Primary | ICD-10-CM

## 2023-08-21 PROCEDURE — 99213 OFFICE O/P EST LOW 20 MIN: CPT | Performed by: FAMILY MEDICINE

## 2023-08-21 PROCEDURE — 3078F DIAST BP <80 MM HG: CPT | Performed by: FAMILY MEDICINE

## 2023-08-21 PROCEDURE — 3008F BODY MASS INDEX DOCD: CPT | Performed by: FAMILY MEDICINE

## 2023-08-21 PROCEDURE — 3074F SYST BP LT 130 MM HG: CPT | Performed by: FAMILY MEDICINE

## 2023-08-21 RX ORDER — SPIRONOLACTONE 50 MG/1
TABLET, FILM COATED ORAL
COMMUNITY
Start: 2023-06-12

## 2023-08-21 RX ORDER — MULTIVIT-MIN/IRON FUM/FOLIC AC 7.5 MG-4
1 TABLET ORAL DAILY
COMMUNITY

## 2023-08-22 ENCOUNTER — LAB ENCOUNTER (OUTPATIENT)
Dept: LAB | Age: 38
End: 2023-08-22
Attending: FAMILY MEDICINE
Payer: COMMERCIAL

## 2023-08-22 DIAGNOSIS — K13.79 RECURRENT MOUTH ULCERATION: ICD-10-CM

## 2023-08-22 LAB
BASOPHILS # BLD AUTO: 0.01 X10(3) UL (ref 0–0.2)
BASOPHILS NFR BLD AUTO: 0.2 %
DEPRECATED HBV CORE AB SER IA-ACNC: 43.1 NG/ML
EOSINOPHIL # BLD AUTO: 0.13 X10(3) UL (ref 0–0.7)
EOSINOPHIL NFR BLD AUTO: 2.8 %
ERYTHROCYTE [DISTWIDTH] IN BLOOD BY AUTOMATED COUNT: 13.8 %
FOLATE SERPL-MCNC: 22.4 NG/ML (ref 8.7–?)
HCT VFR BLD AUTO: 42.9 %
HGB BLD-MCNC: 13.8 G/DL
IMM GRANULOCYTES # BLD AUTO: 0.01 X10(3) UL (ref 0–1)
IMM GRANULOCYTES NFR BLD: 0.2 %
LYMPHOCYTES # BLD AUTO: 1.64 X10(3) UL (ref 1–4)
LYMPHOCYTES NFR BLD AUTO: 35.2 %
MCH RBC QN AUTO: 28.8 PG (ref 26–34)
MCHC RBC AUTO-ENTMCNC: 32.2 G/DL (ref 31–37)
MCV RBC AUTO: 89.4 FL
MONOCYTES # BLD AUTO: 0.29 X10(3) UL (ref 0.1–1)
MONOCYTES NFR BLD AUTO: 6.2 %
NEUTROPHILS # BLD AUTO: 2.58 X10 (3) UL (ref 1.5–7.7)
NEUTROPHILS # BLD AUTO: 2.58 X10(3) UL (ref 1.5–7.7)
NEUTROPHILS NFR BLD AUTO: 55.4 %
PLATELET # BLD AUTO: 311 10(3)UL (ref 150–450)
RBC # BLD AUTO: 4.8 X10(6)UL
VIT B12 SERPL-MCNC: 916 PG/ML (ref 193–986)
VIT D+METAB SERPL-MCNC: 32.2 NG/ML (ref 30–100)
WBC # BLD AUTO: 4.7 X10(3) UL (ref 4–11)

## 2023-08-22 PROCEDURE — 85025 COMPLETE CBC W/AUTO DIFF WBC: CPT | Performed by: FAMILY MEDICINE

## 2023-08-22 PROCEDURE — 82607 VITAMIN B-12: CPT | Performed by: FAMILY MEDICINE

## 2023-08-22 PROCEDURE — 82746 ASSAY OF FOLIC ACID SERUM: CPT | Performed by: FAMILY MEDICINE

## 2023-08-22 PROCEDURE — 82306 VITAMIN D 25 HYDROXY: CPT | Performed by: FAMILY MEDICINE

## 2023-08-22 PROCEDURE — 82728 ASSAY OF FERRITIN: CPT | Performed by: FAMILY MEDICINE

## 2023-08-22 PROCEDURE — 84591 ASSAY OF NOS VITAMIN: CPT | Performed by: FAMILY MEDICINE

## 2023-09-01 LAB
NICOTINAMIDE: 33.8 NG/ML
NICOTINIC ACID: <5 NG/ML

## 2024-01-01 ENCOUNTER — PATIENT MESSAGE (OUTPATIENT)
Dept: FAMILY MEDICINE CLINIC | Facility: CLINIC | Age: 39
End: 2024-01-01

## 2024-01-02 NOTE — TELEPHONE ENCOUNTER
From: Kirsten Cristobal  To: Niurka Encinas  Sent: 1/1/2024 9:00 AM CST  Subject: Canker Sores     Jaycob Encinas,   I am revisiting you since I am back to getting canker sores. In December 2023 I tracked the times I would get a new one and I had 5 in total. The last few days they've been more than usual. I would like to set an appointment to be seen but the earliest I see is in Mid February. I do see there is one listed for tomorrow but noticed it states I have to be [place] to take that spot. Is there a reason why?   While I obviously reside in [place] I'm currently visiting my parents in [place]. I have some new questions and testing id like to be done.

## 2024-01-08 ENCOUNTER — OFFICE VISIT (OUTPATIENT)
Dept: FAMILY MEDICINE CLINIC | Facility: CLINIC | Age: 39
End: 2024-01-08
Payer: COMMERCIAL

## 2024-01-08 VITALS
DIASTOLIC BLOOD PRESSURE: 64 MMHG | OXYGEN SATURATION: 97 % | HEIGHT: 68 IN | SYSTOLIC BLOOD PRESSURE: 122 MMHG | RESPIRATION RATE: 16 BRPM | HEART RATE: 68 BPM | BODY MASS INDEX: 28.95 KG/M2 | TEMPERATURE: 97 F | WEIGHT: 191 LBS

## 2024-01-08 DIAGNOSIS — K12.0 RECURRENT APHTHOUS ULCER: Primary | ICD-10-CM

## 2024-01-08 PROCEDURE — 3074F SYST BP LT 130 MM HG: CPT | Performed by: FAMILY MEDICINE

## 2024-01-08 PROCEDURE — 99213 OFFICE O/P EST LOW 20 MIN: CPT | Performed by: FAMILY MEDICINE

## 2024-01-08 PROCEDURE — 3078F DIAST BP <80 MM HG: CPT | Performed by: FAMILY MEDICINE

## 2024-01-08 PROCEDURE — 3008F BODY MASS INDEX DOCD: CPT | Performed by: FAMILY MEDICINE

## 2024-01-08 RX ORDER — CLASCOTERONE 1 G/100G
1 CREAM TOPICAL 2 TIMES DAILY
COMMUNITY
Start: 2023-08-23

## 2024-01-08 RX ORDER — FOLIC ACID 1 MG/1
TABLET ORAL DAILY
COMMUNITY

## 2024-01-08 RX ORDER — MULTIVITAMIN WITH IRON
50 TABLET ORAL DAILY
COMMUNITY

## 2024-01-08 NOTE — PROGRESS NOTES
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 Has had one about once a week and then clears within a week. Once had 3 under the upper lip.  Has been following with her dentist has been giving her lidocaine and a hydrogen peroxide mouthwash.  Given recurrence, recommended she look into an underlying cause and then mentions behcets.  Patient has had history of blood or mucus in stools and it was recommended she follow-up with a colonoscopy in the past.  Otherwise, no other symptoms including rashes, eye issues.  Last visit your labs including vitamin B-12, folic acid, B3 and iron.  These were all normal.     Health Maintenance:  Health Maintenance   Topic Date Due    DTaP,Tdap,and Td Vaccines (1 - Tdap) Never done    Annual Physical  2022    COVID-19 Vaccine (4 - - season) 2023    Influenza Vaccine (1) 10/01/2023    Annual Depression Screening  2024    Pap Smear  04/15/2024    Pneumococcal Vaccine: Birth to 64yrs  Aged Out       ROS: Review of systems performed and negative unless stated in HPI.    Past medical, family and social history reviewed and listed as below.    HISTORY:  Past Medical History:   Diagnosis Date    Anemia     Bloating Off and on    Blood in the stool     Change in hair     Hemorrhoids     Irregular bowel habits Off and on    MTHFR mutation     Weight gain     Need to eat better      Past Surgical History:   Procedure Laterality Date          OTHER      c sections total of 3      Family History   Problem Relation Age of Onset    Cancer Maternal Grandmother         melanoma    Diabetes Father       Social History     Socioeconomic History    Marital status:      Number of children: 3   Tobacco Use    Smoking status: Never    Smokeless tobacco: Never   Vaping Use    Vaping Use: Never used   Substance and Sexual Activity    Alcohol use: Yes     Alcohol/week: 0.0 standard drinks of alcohol     Comment: Minimal 2-3x a year    Drug use: Never   Other Topics Concern    Caffeine Concern Yes     Comment: 1 cup of coffee daily    Exercise No    Seat Belt Yes    Self-Exams No        Current Outpatient Medications   Medication Sig Dispense Refill    Clascoterone (WINLEVI) 1 % External Cream Apply 1 Application topically 2 (two) times daily.      folic acid 1 MG Oral Tab Take by mouth daily.      vitamin B-12 50 MCG Oral Tab Take 1 tablet (50 mcg total) by mouth daily.      Multiple Vitamins-Minerals (MULTI-VITAMIN/MINERALS) Oral Tab Take 1 tablet by mouth daily.       Allergies:  No Known Allergies    EXAM:  Vitals:    01/08/24 0925   BP: 122/64   Pulse: 68   Resp: 16   Temp: 97.1 °F (36.2 °C)   SpO2: 97%   Weight: 191 lb (86.6 kg)   Height: 5' 8\" (1.727 m)     GENERAL: vitals reviewed and listed above, alert, oriented, appears well hydrated and in no acute distress  HEENT: atraumatic, conjunctiva clear, no obvious abnormalities on inspection   LUNGS: breathing comfortably  EXTREMITIES: warm and well perfused  PSYCH: pleasant and cooperative, no obvious depression or anxiety    ASSESSMENT AND PLAN:  Discussed the following assessment   Problem List Items Addressed This Visit    None  Visit Diagnoses       Recurrent aphthous ulcer    -  Primary    Relevant Orders    ZINC, PLASMA OR SERUM [945][Q]    Sed Rate, Westergren (Automated) [E]    C-Reactive Protein [E]    Connective Tissue Disease (TAJ) Screen [E]    Immunoglobulin D, Quant, Serum [E]    Homocysteine [E]            Advised the following:  Hayde was seen today for follow - up.    Diagnoses and all orders for this visit:    Recurrent aphthous ulcer  Unclear etiology.  Given patient was sick but the times this happened, possibly  related to suppressed immune system.  Cannot exclude HSV and will plan for swab if she has another course of these.  Will check a few other underlying labs as below.  Given history of mucus/blood in the stool, do recommend she follow-up with GI for likely colonoscopy to rule out underlying cause such as Crohn's.  -     ZINC, PLASMA OR SERUM [945][Q]; Future  -     Sed Rate, Westergren (Automated) [E]; Future  -     C-Reactive Protein [E]; Future  -     Connective Tissue Disease (TAJ) Screen [E]; Future  -     Immunoglobulin D, Quant, Serum [E]; Future  -     Homocysteine [E]; Future    Concerning signs and symptoms that warrant returning to the clinic reviewed and patient demonstrated understanding.    Niurka Encinas DO  1/8/2024 9:44 AM  Family Medicine    Note to Patient  The 21st Century Cures Act makes medical notes like these available to patients in the interest of transparency. However, be advised this is a medical document and is intended as [id number]-xi-spti communication; it is written in medical language and may appear blunt, direct, or contain abbreviations or verbiage that are unfamiliar. Medical documents are intended to carry relevant information, facts as evident, and the clinical opinion of the practitioner.     This report has been produced using speech recognition software, and may contain errors related to grammar, punctuation, spelling, words or phrases unrecognized or not translated appropriately to text; these errors may be referred to the dictating provider for further clarification and/or addendum as needed.

## 2024-01-11 ENCOUNTER — LAB ENCOUNTER (OUTPATIENT)
Dept: LAB | Age: 39
End: 2024-01-11
Attending: FAMILY MEDICINE
Payer: COMMERCIAL

## 2024-01-11 DIAGNOSIS — K12.0 RECURRENT APHTHOUS ULCER: ICD-10-CM

## 2024-01-11 LAB
CRP SERPL-MCNC: 0.39 MG/DL (ref ?–0.3)
ERYTHROCYTE [SEDIMENTATION RATE] IN BLOOD: 30 MM/HR
HCYS SERPL-SCNC: 5 UMOL/L (ref 3.2–10.7)

## 2024-01-11 PROCEDURE — 83090 ASSAY OF HOMOCYSTEINE: CPT

## 2024-01-11 PROCEDURE — 82784 ASSAY IGA/IGD/IGG/IGM EACH: CPT

## 2024-01-11 PROCEDURE — 85652 RBC SED RATE AUTOMATED: CPT

## 2024-01-11 PROCEDURE — 84630 ASSAY OF ZINC: CPT

## 2024-01-11 PROCEDURE — 86140 C-REACTIVE PROTEIN: CPT

## 2024-01-11 PROCEDURE — 86225 DNA ANTIBODY NATIVE: CPT

## 2024-01-11 PROCEDURE — 86038 ANTINUCLEAR ANTIBODIES: CPT

## 2024-01-13 LAB — IGD: 2.73 MG/DL

## 2024-01-16 ENCOUNTER — PATIENT MESSAGE (OUTPATIENT)
Dept: FAMILY MEDICINE CLINIC | Facility: CLINIC | Age: 39
End: 2024-01-16

## 2024-01-16 LAB
DSDNA IGG SERPL IA-ACNC: 1.4 IU/ML
ENA AB SER QL IA: 0.2 UG/L
ENA AB SER QL IA: NEGATIVE

## 2024-01-16 NOTE — TELEPHONE ENCOUNTER
From: Kirsten Cristobal  To: Niurkasa Encinas  Sent: 1/16/2024 11:55 AM CST  Subject: test results    Overall, how did my lab work come back? I know you mentioned that I did not need to fast. When I walked into do my testing for the immoglubin, I believe she said I needed to fast. I'm not sure if that skewed it as all I had that morning was a cup of coffee with a little half and half. I've been avoiding looking at the lab work because it makes me anxious but I'd love to know the summary.

## 2024-02-29 ENCOUNTER — OFFICE VISIT (OUTPATIENT)
Dept: FAMILY MEDICINE CLINIC | Facility: CLINIC | Age: 39
End: 2024-02-29
Payer: COMMERCIAL

## 2024-02-29 VITALS
HEART RATE: 71 BPM | TEMPERATURE: 98 F | OXYGEN SATURATION: 98 % | RESPIRATION RATE: 16 BRPM | HEIGHT: 68 IN | BODY MASS INDEX: 28.19 KG/M2 | WEIGHT: 186 LBS | DIASTOLIC BLOOD PRESSURE: 69 MMHG | SYSTOLIC BLOOD PRESSURE: 113 MMHG

## 2024-02-29 DIAGNOSIS — R06.2 WHEEZE: ICD-10-CM

## 2024-02-29 DIAGNOSIS — J98.01 BRONCHOSPASM: Primary | ICD-10-CM

## 2024-02-29 RX ORDER — ALBUTEROL SULFATE 90 UG/1
AEROSOL, METERED RESPIRATORY (INHALATION)
Qty: 18 G | Refills: 0 | Status: SHIPPED | OUTPATIENT
Start: 2024-02-29

## 2024-02-29 RX ORDER — BENZONATATE 100 MG/1
100 CAPSULE ORAL 3 TIMES DAILY PRN
Qty: 30 CAPSULE | Refills: 0 | Status: SHIPPED | OUTPATIENT
Start: 2024-02-29

## 2024-02-29 NOTE — PROGRESS NOTES
CHIEF COMPLAINT:     Chief Complaint   Patient presents with    Cough     X 4 days, chest burning, wheezing, no otc          HPI:   Kirsten Cristobal is a 38 year old female who presents for cough for  4  days.  Cough started gradually and is described as tight and deep. Patient has history of bronchitis, reports that it was several years ago.  Cough is not productive and is worse at night. Other triggers for the cough: talking  Home treatments include: nothing.      The patientis not a smoker.        Current Outpatient Medications   Medication Sig Dispense Refill    ALBUTEROL 108 (90 Base) MCG/ACT Inhalation Aero Soln 2 puffs 4 times per day for 1 week, then as needed for 1 week. 18 g 0    benzonatate 100 MG Oral Cap Take 1 capsule (100 mg total) by mouth 3 (three) times daily as needed for cough. 30 capsule 0    Clascoterone (WINLEVI) 1 % External Cream Apply 1 Application topically 2 (two) times daily.      folic acid 1 MG Oral Tab Take by mouth daily.      vitamin B-12 50 MCG Oral Tab Take 1 tablet (50 mcg total) by mouth daily.      Multiple Vitamins-Minerals (MULTI-VITAMIN/MINERALS) Oral Tab Take 1 tablet by mouth daily.        Past Medical History:   Diagnosis Date    Anemia     Bloating Off and on    Blood in the stool     Change in hair     Hemorrhoids     Irregular bowel habits Off and on    MTHFR mutation     Weight gain     Need to eat better      Social History:  Social History     Socioeconomic History    Marital status:     Number of children: 3   Tobacco Use    Smoking status: Never    Smokeless tobacco: Never   Vaping Use    Vaping Use: Never used   Substance and Sexual Activity    Alcohol use: Yes     Alcohol/week: 0.0 standard drinks of alcohol     Comment: Minimal 2-3x a year    Drug use: Never   Other Topics Concern    Caffeine Concern Yes     Comment: 1 cup of coffee daily    Exercise No    Seat Belt Yes    Self-Exams No        REVIEW OF SYSTEMS:   GENERAL: No fever or chills.  SKIN: No  rashes, or other skin lesions.   EYES: Denies blurred vision or double vision.  HENT: Denies ear pain, decreased hearing, or sore throat.  Reports mild sinus congestion.  CARDIOVASCULAR: Denies chest pain or palpitations  LUNGS: Per HPI. Denies shortness of breath with exertion or rest.   GI: Denies N/V/C/D or abdominal pain.      EXAM:   /69   Pulse 71   Temp 97.5 °F (36.4 °C)   Resp 16   Ht 5' 8\" (1.727 m)   Wt 186 lb (84.4 kg)   LMP 02/20/2023 (Approximate)   SpO2 98%   BMI 28.28 kg/m²   GENERAL: well developed, well nourished,in no apparent distress.  Not toxic appearing.  No labored breathing  SKIN: no rashes, no suspicious lesions  EYES: Conjunctiva clear.  No scleral icterus.  HENT: Atraumatic, normocephalic.  TM's clear bilaterally.  Nostrils patent, nasal mucosa pink and non-inflamed.  No erythema of the throat.  NECK: supple, non-tender.  LUNGS: Normal respiratory rate. Normal effort.  Dry cough. + wheezing in all lung fields. No rales or crackles. No dullness on percussion of anterior and posterior chest wall. No decreased BS.    CARDIO: RRR without murmur  LYMPH: No cervical or supraclavicular lymphadenopathy.   EXTREMITIES:  No clubbing, cyanosis, or edema.    ASSESSMENT AND PLAN:   Kirsten Cristobal is a 38 year old female who presents with: wheezing that does not clear with cough. Reports post tussive emesis. Will treat with Albuterol inhaler.Also given tessalon pearls, will fill if needed.     ASSESSMENT:  Encounter Diagnosis   Name Primary?    Bronchospasm Yes       PLAN:  Rx as below.  Given resource for use of Inhaler.   Please remember that illnesses can change quickly, and although it is not felt that your symptoms currently require further treatment at the ER if you symptoms do worsen, change or if new symptoms were to develop please seek emergent care at the nearest ER.       Meds & Refills for this Visit:  Requested Prescriptions     Signed Prescriptions Disp Refills     ALBUTEROL 108 (90 Base) MCG/ACT Inhalation Aero Soln 18 g 0     Si puffs 4 times per day for 1 week, then as needed for 1 week.    benzonatate 100 MG Oral Cap 30 capsule 0     Sig: Take 1 capsule (100 mg total) by mouth 3 (three) times daily as needed for cough.           The patient indicates understanding of these issues and agrees to the plan.  The patient is asked to return if sx's persist or worsen.  Go to ER for significant shortness of breath, persistent fever.

## 2024-10-25 ENCOUNTER — TELEPHONE (OUTPATIENT)
Dept: FAMILY MEDICINE CLINIC | Facility: CLINIC | Age: 39
End: 2024-10-25

## 2024-10-25 NOTE — TELEPHONE ENCOUNTER
Patient scheduled Mychart appointment with concerns \"Some swelling buildup \" should we get patient seen sooner she is scheduled out 11/15/2024

## 2024-11-25 PROBLEM — K59.00 CONSTIPATION: Status: ACTIVE | Noted: 2024-11-25

## 2024-11-25 PROBLEM — K92.1 HEMATOCHEZIA: Status: ACTIVE | Noted: 2024-11-25

## 2024-11-25 PROBLEM — R19.5 ABNORMAL FECES: Status: ACTIVE | Noted: 2024-11-25

## 2024-12-11 ENCOUNTER — LAB ENCOUNTER (OUTPATIENT)
Dept: LAB | Age: 39
End: 2024-12-11
Attending: INTERNAL MEDICINE
Payer: COMMERCIAL

## 2024-12-11 DIAGNOSIS — R14.0 BLOATING: ICD-10-CM

## 2024-12-11 DIAGNOSIS — R19.4 CHANGE IN BOWEL HABITS: ICD-10-CM

## 2024-12-11 LAB — IGA SERPL-MCNC: 132.3 MG/DL (ref 40–350)

## 2024-12-11 PROCEDURE — 36415 COLL VENOUS BLD VENIPUNCTURE: CPT

## 2024-12-11 PROCEDURE — 81377 HLA II TYPE 1 AG EQUIV LR: CPT

## 2024-12-11 PROCEDURE — 86364 TISS TRNSGLTMNASE EA IG CLAS: CPT

## 2024-12-11 PROCEDURE — 82784 ASSAY IGA/IGD/IGG/IGM EACH: CPT

## 2024-12-12 LAB — TTG IGA SER-ACNC: <0.2 U/ML (ref ?–7)

## 2024-12-18 LAB
DQ2 (DQA1 0501/0505,DQB1 02XX): NEGATIVE
DQ8 (DQA1 03XX, DQB1 0302): NEGATIVE

## 2025-01-27 ENCOUNTER — OFFICE VISIT (OUTPATIENT)
Dept: FAMILY MEDICINE CLINIC | Facility: CLINIC | Age: 40
End: 2025-01-27
Payer: COMMERCIAL

## 2025-01-27 VITALS
DIASTOLIC BLOOD PRESSURE: 74 MMHG | OXYGEN SATURATION: 98 % | HEART RATE: 74 BPM | TEMPERATURE: 98 F | WEIGHT: 200 LBS | BODY MASS INDEX: 30 KG/M2 | RESPIRATION RATE: 16 BRPM | SYSTOLIC BLOOD PRESSURE: 102 MMHG

## 2025-01-27 DIAGNOSIS — R05.1 ACUTE COUGH: Primary | ICD-10-CM

## 2025-01-27 DIAGNOSIS — M25.561 RIGHT KNEE PAIN, UNSPECIFIED CHRONICITY: ICD-10-CM

## 2025-01-27 PROCEDURE — 3078F DIAST BP <80 MM HG: CPT | Performed by: FAMILY MEDICINE

## 2025-01-27 PROCEDURE — 99213 OFFICE O/P EST LOW 20 MIN: CPT | Performed by: FAMILY MEDICINE

## 2025-01-27 PROCEDURE — 3074F SYST BP LT 130 MM HG: CPT | Performed by: FAMILY MEDICINE

## 2025-01-27 NOTE — PROGRESS NOTES
Chief Complaint:  Chief Complaint   Patient presents with    Leg Pain     Swelling behind right knee, since May. Pain when standing or walking for long period.     Medication Request     Urgent care 1/20/25, requesting medication for cough. Negative for all tests.       HPI:  This is a 39 year old female patient presenting for Leg Pain (Swelling behind right knee, since May. Pain when standing or walking for long period. ) and Medication Request (Urgent care 1/20/25, requesting medication for cough. Negative for all tests.)    Pain and swelling behind R knee after running a 5K in May 2024. Since that time will get pain when she is standing for prolonged periods of time. Will hurt for 2-3 days and then resolves. Denies swelling behind knee or leg swelling since that time. Tried warm baths, elevating. Tylenol if needed.     Does note that is having more fatty deposits in the legs despite exercising regularly     Lingering cough from viral infection. Started over a weeks ago. Worse at nighttime. Feels like she is choking. Seen in UC and was given amoxicillin if she was not getting better. Has not taken it but notes cough persists.       Health Maintenance:  Health Maintenance   Topic Date Due    Annual Physical  Never done    DTaP,Tdap,and Td Vaccines (1 - Tdap) Never done    Pap Smear  04/15/2024    COVID-19 Vaccine (4 - 2024-25 season) 09/01/2024    Influenza Vaccine (1) 10/01/2024    Annual Depression Screening  Never done    Colorectal Cancer Screening  11/25/2034    Pneumococcal Vaccine: Birth to 50yrs  Aged Out    Meningococcal B Vaccine  Aged Out       ROS: Review of systems performed and negative unless stated in HPI.    Past medical, family and social history reviewed and listed as below.    HISTORY:  Past Medical History:    Anemia    Bloating    Blood in the stool    Change in hair    Constipation    Less in the last  3 years    Hemorrhoids    Irregular bowel habits    Leg swelling    After a 5k I noticed a  bump behind the knee and it never really went away    Mouth sores    Canker sores Summer 2024    MTHFR mutation    Weight gain    Need to eat better      Past Surgical History:   Procedure Laterality Date          Other      c sections total of 3      Family History   Problem Relation Age of Onset    Cancer Maternal Grandmother         melanoma    Diabetes Father       Social History     Socioeconomic History    Marital status:     Number of children: 3   Tobacco Use    Smoking status: Never    Smokeless tobacco: Never   Vaping Use    Vaping status: Never Used   Substance and Sexual Activity    Alcohol use: Yes     Comment: Once every few months    Drug use: Never   Other Topics Concern    Caffeine Concern Yes     Comment: 1 cup of coffee daily    Exercise No    Seat Belt Yes    Self-Exams No        Current Outpatient Medications   Medication Sig Dispense Refill    amoxicillin clavulanate 875-125 MG Oral Tab Take 1 tablet by mouth 2 (two) times daily for 10 days. 20 tablet 0    Na Sulfate-K Sulfate-Mg Sulf (SUPREP BOWEL PREP KIT) 17.5-3.13-1.6 GM/177ML Oral Solution Take as directed by physician. 1 each 0    Cholecalciferol (VITAMIN D3) 10 MCG (400 UNIT) Oral Cap Take by mouth.      Ascorbic Acid (VITAMIN C) 100 MG Oral Tab Take 1 tablet (100 mg total) by mouth daily.      vitamin B-12 50 MCG Oral Tab Take 1 tablet (50 mcg total) by mouth daily.      Multiple Vitamins-Minerals (MULTI-VITAMIN/MINERALS) Oral Tab Take 1 tablet by mouth daily.       Allergies:  Allergies[1]    EXAM:  Vitals:    25 0950   BP: 102/74   BP Location: Left arm   Pulse: 74   Resp: 16   Temp: 98.2 °F (36.8 °C)   TempSrc: Oral   SpO2: 98%   Weight: 200 lb (90.7 kg)     GENERAL: vitals reviewed and listed above, alert, oriented, appears well hydrated and in no acute distress  HEENT: atraumatic, conjunctiva clear, no obvious abnormalities on inspection   LUNGS: clear to auscultation bilaterally, no wheezes, rales or rhonchi,  good air movement  CV: HRRR, no murmurs, no peripheral edema   EXTREMITIES: warm and well perfused  R KNEE: no edema, swelling or mass posteriorly  PSYCH: pleasant and cooperative, no obvious depression or anxiety    ASSESSMENT AND PLAN:  Discussed the following assessment   Problem List Items Addressed This Visit    None  Visit Diagnoses       Acute cough    -  Primary    Relevant Medications    amoxicillin clavulanate 875-125 MG Oral Tab    Right knee pain, unspecified chronicity                Advised the following:  Hayde was seen today for leg pain and medication request.    Diagnoses and all orders for this visit:    Acute cough  -   Concerning for sinusitis with PND. Will treat with  amoxicillin clavulanate 875-125 MG Oral Tab; Take 1 tablet by mouth 2 (two) times daily for 10 days.    Right knee pain, unspecified chronicity  Unclear etiology. Can consider physical therapy vs seeing ortho.     Niurka Encinas DO  1/27/2025 10:02 AM  Family Medicine    Note to Patient  The 21st Century Cures Act makes medical notes like these available to patients in the interest of transparency. However, be advised this is a medical document and is intended as xran-wy-blhd communication; it is written in medical language and may appear blunt, direct, or contain abbreviations or verbiage that are unfamiliar. Medical documents are intended to carry relevant information, facts as evident, and the clinical opinion of the practitioner.     This report has been produced using speech recognition software, and may contain errors related to grammar, punctuation, spelling, words or phrases unrecognized or not translated appropriately to text; these errors may be referred to the dictating provider for further clarification and/or addendum as needed.         [1] No Known Allergies

## 2025-06-17 ENCOUNTER — OFFICE VISIT (OUTPATIENT)
Dept: FAMILY MEDICINE CLINIC | Facility: CLINIC | Age: 40
End: 2025-06-17
Payer: COMMERCIAL

## 2025-06-17 VITALS
WEIGHT: 199 LBS | RESPIRATION RATE: 18 BRPM | HEIGHT: 68 IN | HEART RATE: 62 BPM | DIASTOLIC BLOOD PRESSURE: 76 MMHG | BODY MASS INDEX: 30.16 KG/M2 | SYSTOLIC BLOOD PRESSURE: 122 MMHG | OXYGEN SATURATION: 98 %

## 2025-06-17 DIAGNOSIS — R22.42 MASS OF LEFT KNEE: Primary | ICD-10-CM

## 2025-06-17 PROCEDURE — 3008F BODY MASS INDEX DOCD: CPT | Performed by: NURSE PRACTITIONER

## 2025-06-17 PROCEDURE — 3074F SYST BP LT 130 MM HG: CPT | Performed by: NURSE PRACTITIONER

## 2025-06-17 PROCEDURE — 99213 OFFICE O/P EST LOW 20 MIN: CPT | Performed by: NURSE PRACTITIONER

## 2025-06-17 PROCEDURE — 3078F DIAST BP <80 MM HG: CPT | Performed by: NURSE PRACTITIONER

## 2025-06-17 NOTE — PROGRESS NOTES
Chief Complaint   Patient presents with    Other     Per pt has pocket of fluid on the back of left knee x 1 week, not painful     HPI:  Presents with approx 1 week history of painful mass behind left knee. Reports area is painful with activity and seems less painful with resting. Denies known injury to knee. Denies redness, warmth to touch, locking, buckling or limited ROM to knee. Denies redness, swelling, warmth to touch of distal left leg. Has been treating with rest, ice and elevation with minimal relief. Reports had this occur in the past once, lasted approx 2 weeks that time and managed the same as noted, reports it resolved.     Past Medical History[1]    Problem List[2]    Current Medications[3]    Physical Exam  /76   Pulse 62   Resp 18   Ht 5' 8\" (1.727 m)   Wt 199 lb (90.3 kg)   LMP 05/19/2025 (Approximate)   SpO2 98%   BMI 30.26 kg/m²   Constitutional: well developed, well nourished, in no apparent distress  HEENT: Normocephalic and atraumatic.  Cardiovascular: Normal rate.   Pulmonary/Chest: No respiratory distress. Effort normal.   Ext: soft, moveable, minimally tender, non-erythematous mass to posterolateral left distal thigh area proximal to knee joint itself. Knee w/o edema, erythema, deformity, limited ROM.   Skin: Skin is warm and dry. No pallor. No rash noted.    A/P:    Encounter Diagnosis   Name Primary?    Mass of left knee- low suspicion for Baker's cyst given location. Suspect lipoma. Check US mass. Referred to Dr. Hanna for further eval. Discussed current management with compression brace- especially during activity, continued rest, ice and elevation. Notify office if worsening/enlarging, redness, fevers or other concerning symptoms. Verbalized understanding of instructions and agreeable to this plan of care.    Yes       No orders of the defined types were placed in this encounter.      Meds & Refills for this Visit:  Requested Prescriptions      No prescriptions requested or  ordered in this encounter       Imaging & Consults:  ORTHOPEDIC - INTERNAL  US LEG LEFT LIMITED (CPT=76882)    No follow-ups on file.  There are no Patient Instructions on file for this visit.    All questions were answered and the patient understands the plan.            [1]   Past Medical History:   Anemia    Bloating    Blood in the stool    Change in hair    Constipation    Less in the last  3 years    Hemorrhoids    Irregular bowel habits    Leg swelling    After a 5k I noticed a bump behind the knee and it never really went away    Mouth sores    Canker sores Summer 2024    MTHFR mutation    Weight gain    Need to eat better   [2]   Patient Active Problem List  Diagnosis    Iron deficiency anemia during pregnancy (HCC)    Hematochezia    Constipation    Abnormal feces   [3]   Current Outpatient Medications   Medication Sig Dispense Refill    Cholecalciferol (VITAMIN D3) 10 MCG (400 UNIT) Oral Cap Take by mouth.      Ascorbic Acid (VITAMIN C) 100 MG Oral Tab Take 1 tablet (100 mg total) by mouth daily.      Multiple Vitamins-Minerals (MULTI-VITAMIN/MINERALS) Oral Tab Take 1 tablet by mouth daily.

## 2025-06-20 ENCOUNTER — HOSPITAL ENCOUNTER (OUTPATIENT)
Dept: ULTRASOUND IMAGING | Facility: HOSPITAL | Age: 40
Discharge: HOME | End: 2025-06-20
Attending: NURSE PRACTITIONER
Payer: COMMERCIAL

## 2025-06-20 DIAGNOSIS — R22.42 MASS OF LEFT KNEE: ICD-10-CM

## 2025-06-20 PROCEDURE — 76882 US LMTD JT/FCL EVL NVASC XTR: CPT | Performed by: NURSE PRACTITIONER

## 2025-06-26 ENCOUNTER — TELEPHONE (OUTPATIENT)
Dept: ORTHOPEDICS CLINIC | Facility: CLINIC | Age: 40
End: 2025-06-26

## 2025-06-26 DIAGNOSIS — M25.562 LEFT KNEE PAIN, UNSPECIFIED CHRONICITY: Primary | ICD-10-CM

## 2025-06-26 DIAGNOSIS — Z01.89 ENCOUNTER FOR LOWER EXTREMITY COMPARISON IMAGING STUDY: ICD-10-CM

## 2025-06-30 ENCOUNTER — HOSPITAL ENCOUNTER (OUTPATIENT)
Dept: GENERAL RADIOLOGY | Facility: HOSPITAL | Age: 40
Discharge: HOME | End: 2025-06-30
Attending: ORTHOPAEDIC SURGERY
Payer: COMMERCIAL

## 2025-06-30 DIAGNOSIS — Z01.89 ENCOUNTER FOR LOWER EXTREMITY COMPARISON IMAGING STUDY: ICD-10-CM

## 2025-06-30 DIAGNOSIS — M25.562 LEFT KNEE PAIN, UNSPECIFIED CHRONICITY: ICD-10-CM

## 2025-06-30 PROCEDURE — 73564 X-RAY EXAM KNEE 4 OR MORE: CPT | Performed by: ORTHOPAEDIC SURGERY

## 2025-06-30 PROCEDURE — 73562 X-RAY EXAM OF KNEE 3: CPT | Performed by: ORTHOPAEDIC SURGERY

## 2025-06-30 NOTE — TELEPHONE ENCOUNTER
Patient called after completing xrays and is coming in for the left knee. Patient wondering why there was more views of the right knee and if more views of the left is needed

## 2025-07-20 NOTE — H&P
Orthopaedic Surgery  [address] [place] [id number]  [phone]     NEW PATIENT VISIT - HISTORY AND PHYSICAL EXAMINATION     Name: Kirsten Cristobal   MRN: [id number]  Date: 7/21/2025     CC: Left Knee Pain    REFERRED BY: Niurka Encinas DO    HPI:   Kirsten Cristobal is a very pleasant 40 year old female who presents today for evaluation of a 1 month history of painful mass behind left knee. Reports area is painful with activity and seems less painful with resting. Denies known injury to knee. Rated pain is 0/10. Denies redness, warmth to touch, locking, buckling or limited ROM to knee. Denies redness, swelling, warmth to touch of distal left leg. Pain is increased with prolonged standing. She recently ran a 10K, after she noticed the mass and increased pain. Has been treating with rest, ice and elevation with minimal relief. Reports had this occur in the past once, lasted approx 2 weeks that time and managed the same as noted, reports it resolved.     She has been taking Tylenol for any discomfort or pain.     The patient enjoys [place] biking. She is a stay at home mom.       PMH:   Past Medical History[1]    PAST SURGICAL HX:  Past Surgical History[2]    FAMILY HX:  Family History[3]    ALLERGIES:  Patient has no known allergies.    MEDICATIONS: Current Medications[4]    ROS: A comprehensive 14 point review of systems was performed and was negative aside from the aforementioned per history of present illness.    SOCIAL HX:  Social History     Tobacco Use    Smoking status: Never    Smokeless tobacco: Never   Substance Use Topics    Alcohol use: Yes     Comment: Once every few months       PE:   There were no vitals filed for this visit.  Estimated body mass index is 30.26 kg/m² as calculated from the following:    Height as of 6/17/25: 5' 8\" (1.727 m).    Weight as of 6/17/25: 199 lb.    Physical Exam  Constitutional:       Appearance: Normal appearance.   HENT:      Head: Normocephalic and  atraumatic.   Eyes:      Extraocular Movements: Extraocular movements intact.   Neck:      Musculoskeletal: Normal range of motion and neck supple.   Cardiovascular:      Pulses: Normal pulses.   Pulmonary:      Effort: Pulmonary effort is normal. No respiratory distress.   Abdominal:      General: There is no distension.   Skin:     General: Skin is warm.      Capillary Refill: Capillary refill takes less than 2 seconds.      Findings: No bruising.   Neurological:      General: No focal deficit present.      Mental Status: Alert.   Psychiatric:         Mood and Affect: Mood normal.     Examination of the left knee demonstrates:   Skin is intact, warm and dry.  Small soft tissue mass along the posterolateral aspect of the knee, mild evidence of likely fatty tissue.  Atrophy: none    Effusion: none    Joint line tenderness: none  Crepitation: none   Esvin: Positive   Patellar mobility: normal without apprehension  J-sign: none    ROM: Extension full  Flexion 140 degrees  ACL:  Negative Lachman, Negative Pivot Shift   PCL:  Negative Posterior Drawer  Collateral Ligaments: Stable to Varus and Valgus stress at 0 and 30 degrees  Strength: normal   Hip joint: normal pain-free ROM   Gait:  normal   Leg length: equal and symmetric  Alignment:  neutral     No obvious peripheral edema noted.   Distal neurovascular exam demonstrates normal perfusion, intact sensation to light touch and full strength.     Examination of the contralateral knee demonstrates:  No significant atrophy, swelling or effusion. Full range of motion. Neurovascularly intact distally    Radiographic Examination/Diagnostics:  Knee XR and US personally viewed, independently interpreted and radiology report was reviewed.    XR KNEE, COMPLETE (4 OR MORE VIEWS), RIGHT (CPT=73564)  Result Date: 6/30/2025  PROCEDURE: XR KNEE, COMPLETE (4 OR MORE VIEWS), RIGHT (CPT=73564)   INDICATIONS: DX-Z01.89 Encounter for lower extremity comparison imaging study;    COMPARISON: No comparisons.     CONCLUSION: Negative for fracture or malalignment. 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 Normal mineralization. Joint spaces are preserved. No patellar subluxation. No joint effusion. No soft tissue swelling.   Electronically Verified and Signed by Attending Radiologist: Haley Bynum MD 6/30/2025 10:04 AM Workstation: [id number]    XR KNEE (3 VIEWS), LEFT (CPT=73562)  Result Date: 6/30/2025  PROCEDURE: XR KNEE (3 VIEWS), LEFT (CPT=73562)   INDICATIONS: [id number] Left knee pain, unspecified chronicity;   COMPARISON: No comparisons.     CONCLUSION: Negative for fracture or malalignment. Normal mineralization. Joint spaces are preserved. No patellar subluxation. No joint effusion. No soft tissue swelling.   Electronically Verified and Signed by Attending Radiologist: Haley Bynum MD 6/30/2025 10:04 AM   Workstation: [id number]    PROCEDURE:  US LEG LEFT LIMITED (CPT=76882)  COMPARISON:  None.  INDICATIONS:  R22.42 Mass of left knee  TECHNIQUE:  Sonography was performed of the clinically requested area of interest.  PATIENT STATED HISTORY: (As transcribed by Technologist)  Left posterior knee mass.      CONCLUSION:    Grayscale and color Doppler sonography of the left posterior knee performed. No discrete mass, organized fluid collection, inflammatory process, or other focal abnormality detected. Specifically, no Baker's cyst identified.  LOCATION:  [facility]  Dictated by (CST): Haley Bynum MD on 6/20/2025 at 2:11 PM      Finalized by (CST): Haley Bynum MD on 6/20/2025 at 2:12 PM          IMPRESSION: Kirsten Cristobal is a 40 year old female with a sprain of the knee and a mass in the soft tissue of left knee. A MRI scan will be obtained to further characterize the underlying pathology.  Likely lipoma.    We elected to maximize conservative management with physical therapy.    PLAN:   We had a detailed discussion outlining the etiology,  anatomy, pathophysiology, and natural history of the patient's findings. Imaging was reviewed in detail and correlated to a 3-dimensional model of the knee.     In light of the chronicity of symptoms, loss of normal function, and  failure to progress conservatively we recommend an MRI to evaluate the integrity of the patient's findings. The patient will follow up after imaging.   Differential diagnosis includes but not limited to: meniscus pathology, impingement, tendinopathy, cartilage injury/loose body, bone marrow edema, and osteoarthritis.     External records were also reviewed for pertinent historical findings contributing to the patients undiagnosed new problem with uncertain prognosis.     We discussed starting PT, and if there is improvement she can put the MRI on hold. If she does not notice any improvement I advised making an appointment to get the MRI and we will discuss future treatment options at that time.     The patient had the opportunity to ask questions, and all questions were answered appropriately.     FOLLOW-UP:  Proceed with physical therapy and return for repeat evaluation in 6-10 weeks. No imaging required at next visit.       Brittney Hanna MD  Knee, Shoulder, & Elbow Surgery / Sports Medicine Specialist  Orthopaedic Surgery  [address] [place] [address].org  [email or web address]  t: [phone]  o: [phone]  f: [phone]    This note was dictated using Dragon software.  While it was briefly proofread prior to completion, some grammatical, spelling, and word choice errors due to dictation may still occur.  I, Christie Miguel, attest that this documentation has been prepared under the direction and in the presence of Dr. Brittney Hanna MD.           [1]   Past Medical History:   Anemia    Bloating    Blood in the stool    Change in hair    Constipation    Less in the last  3 years    Hemorrhoids    Irregular bowel habits    Leg swelling    After a 5k I  noticed a bump behind the knee and it never really went away    Mouth sores    Canker sores Summer 2024    MTHFR mutation    Weight gain    Need to eat better   [2]   Past Surgical History:  Procedure Laterality Date          Other      c sections total of 3   [3]   Family History  Problem Relation Age of Onset    Cancer Maternal Grandmother         melanoma    Diabetes Father    [4]   Current Outpatient Medications   Medication Sig Dispense Refill    Cholecalciferol (VITAMIN D3) 10 MCG (400 UNIT) Oral Cap Take by mouth.      Ascorbic Acid (VITAMIN C) 100 MG Oral Tab Take 1 tablet (100 mg total) by mouth daily.      Multiple Vitamins-Minerals (MULTI-VITAMIN/MINERALS) Oral Tab Take 1 tablet by mouth daily.

## 2025-07-21 ENCOUNTER — OFFICE VISIT (OUTPATIENT)
Dept: ORTHOPEDICS CLINIC | Facility: CLINIC | Age: 40
End: 2025-07-21
Payer: COMMERCIAL

## 2025-07-21 VITALS — BODY MASS INDEX: 30.31 KG/M2 | HEIGHT: 68 IN | WEIGHT: 200 LBS

## 2025-07-21 DIAGNOSIS — S83.412A SPRAIN OF MEDIAL COLLATERAL LIGAMENT OF LEFT KNEE, INITIAL ENCOUNTER: Primary | ICD-10-CM

## 2025-07-21 DIAGNOSIS — M79.89 MASS OF SOFT TISSUE OF KNEE: ICD-10-CM

## 2025-07-21 PROCEDURE — 99204 OFFICE O/P NEW MOD 45 MIN: CPT | Performed by: ORTHOPAEDIC SURGERY

## 2025-07-21 PROCEDURE — 3008F BODY MASS INDEX DOCD: CPT | Performed by: ORTHOPAEDIC SURGERY

## 2025-08-21 ENCOUNTER — TELEPHONE (OUTPATIENT)
Dept: FAMILY MEDICINE CLINIC | Facility: CLINIC | Age: 40
End: 2025-08-21

## 2025-08-21 DIAGNOSIS — Z00.00 WELL WOMAN EXAM WITHOUT GYNECOLOGICAL EXAM: ICD-10-CM

## 2025-08-21 DIAGNOSIS — Z13.1 SCREENING FOR DIABETES MELLITUS: ICD-10-CM

## 2025-08-21 DIAGNOSIS — K92.1 BLOODY STOOL: ICD-10-CM

## 2025-08-21 DIAGNOSIS — Z00.00 ROUTINE HEALTH MAINTENANCE: Primary | ICD-10-CM

## 2025-08-21 DIAGNOSIS — E55.9 VITAMIN D DEFICIENCY: ICD-10-CM

## 2025-08-21 DIAGNOSIS — Z13.29 SCREENING FOR THYROID DISORDER: ICD-10-CM

## (undated) NOTE — LETTER
Ludy Correa 89  [address] [facility] [address] [facility]  Authorization for Surgical Operation and Procedure     Date:   01/07/2021_______                                                                                                         Time:___ 4.   Should the need arise during my operation or immediate post-operative period, I also consent to the administration of blood and/or blood products.   Further, I understand that despite careful testing and screening of blood or blood products by wyatt 8.   I recognize that in the event my procedure results in extended X-Ray/fluoroscopy time, I may develop a skin reaction. 9.  If I have a Do Not Attempt Resuscitation (DNAR) order in place, that status will be suspended while in the operating room, proc 1. IChay agree to be cared for by an anesthesiologist, who is specially trained to monitor me and give me medicine to put me to sleep or keep me comfortable during my procedure.     I understand that my anesthesiologist is not an employee or age 5. My doctor has explained to me other choices available to me for my care (alternatives).     6. Pregnant Patients (“epidural”):  I understand that the risks of having an epidural (medicine given into my back to help control pain during labor), include itc